# Patient Record
Sex: FEMALE | Race: WHITE | URBAN - METROPOLITAN AREA
[De-identification: names, ages, dates, MRNs, and addresses within clinical notes are randomized per-mention and may not be internally consistent; named-entity substitution may affect disease eponyms.]

---

## 2019-02-11 ENCOUNTER — OFFICE VISIT (OUTPATIENT)
Dept: FAMILY MEDICINE CLINIC | Age: 79
End: 2019-02-11
Payer: MEDICARE

## 2019-02-11 VITALS
RESPIRATION RATE: 18 BRPM | TEMPERATURE: 96.8 F | HEART RATE: 130 BPM | OXYGEN SATURATION: 99 % | SYSTOLIC BLOOD PRESSURE: 142 MMHG | DIASTOLIC BLOOD PRESSURE: 80 MMHG | HEIGHT: 63 IN | WEIGHT: 180 LBS | BODY MASS INDEX: 31.89 KG/M2

## 2019-02-11 DIAGNOSIS — T78.40XA ALLERGIC REACTION TO DRUG, INITIAL ENCOUNTER: Primary | ICD-10-CM

## 2019-02-11 DIAGNOSIS — R22.0 FACIAL SWELLING: ICD-10-CM

## 2019-02-11 PROCEDURE — 99213 OFFICE O/P EST LOW 20 MIN: CPT | Performed by: NURSE PRACTITIONER

## 2019-02-11 PROCEDURE — 96372 THER/PROPH/DIAG INJ SC/IM: CPT | Performed by: NURSE PRACTITIONER

## 2019-02-11 RX ORDER — OMEPRAZOLE 20 MG/1
20 CAPSULE, DELAYED RELEASE ORAL
COMMUNITY
End: 2020-02-17

## 2019-02-11 RX ORDER — SIMVASTATIN 40 MG
40 TABLET ORAL NIGHTLY
COMMUNITY
Start: 2018-11-26

## 2019-02-11 RX ORDER — TRIAMCINOLONE ACETONIDE 40 MG/ML
80 INJECTION, SUSPENSION INTRA-ARTICULAR; INTRAMUSCULAR ONCE
Status: COMPLETED | OUTPATIENT
Start: 2019-02-11 | End: 2019-02-11

## 2019-02-11 RX ORDER — LOSARTAN POTASSIUM 100 MG/1
100 TABLET ORAL DAILY
COMMUNITY
Start: 2018-12-18

## 2019-02-11 RX ORDER — PANTOPRAZOLE SODIUM 40 MG/1
40 TABLET, DELAYED RELEASE ORAL DAILY
COMMUNITY
Start: 2019-01-18 | End: 2020-02-24 | Stop reason: CLARIF

## 2019-02-11 RX ORDER — IBANDRONATE SODIUM 150 MG/1
50 TABLET, FILM COATED ORAL
COMMUNITY
End: 2020-02-24 | Stop reason: ALTCHOICE

## 2019-02-11 RX ORDER — PROPRANOLOL HCL 60 MG
60 CAPSULE, EXTENDED RELEASE 24HR ORAL DAILY
COMMUNITY
Start: 2018-11-26 | End: 2020-06-02 | Stop reason: SDUPTHER

## 2019-02-11 RX ORDER — TRIMETHOPRIM 100 MG/1
TABLET ORAL
COMMUNITY
Start: 2019-01-23 | End: 2019-02-11

## 2019-02-11 RX ORDER — MELOXICAM 15 MG/1
15 TABLET ORAL PRN
COMMUNITY
Start: 2018-12-18

## 2019-02-11 RX ADMIN — TRIAMCINOLONE ACETONIDE 80 MG: 40 INJECTION, SUSPENSION INTRA-ARTICULAR; INTRAMUSCULAR at 14:14

## 2019-02-11 ASSESSMENT — PATIENT HEALTH QUESTIONNAIRE - PHQ9
2. FEELING DOWN, DEPRESSED OR HOPELESS: 0
SUM OF ALL RESPONSES TO PHQ QUESTIONS 1-9: 0
SUM OF ALL RESPONSES TO PHQ9 QUESTIONS 1 & 2: 0
1. LITTLE INTEREST OR PLEASURE IN DOING THINGS: 0
SUM OF ALL RESPONSES TO PHQ QUESTIONS 1-9: 0

## 2019-02-11 ASSESSMENT — ENCOUNTER SYMPTOMS: ALLERGIC REACTION: 1

## 2019-05-16 ENCOUNTER — OFFICE VISIT (OUTPATIENT)
Dept: FAMILY MEDICINE CLINIC | Age: 79
End: 2019-05-16
Payer: MEDICARE

## 2019-05-16 VITALS
SYSTOLIC BLOOD PRESSURE: 132 MMHG | TEMPERATURE: 98.9 F | DIASTOLIC BLOOD PRESSURE: 78 MMHG | OXYGEN SATURATION: 98 % | BODY MASS INDEX: 32.64 KG/M2 | RESPIRATION RATE: 16 BRPM | HEART RATE: 81 BPM | WEIGHT: 184.2 LBS | HEIGHT: 63 IN

## 2019-05-16 DIAGNOSIS — J40 BRONCHITIS: Primary | ICD-10-CM

## 2019-05-16 PROBLEM — Z86.0100 HISTORY OF COLONIC POLYPS: Status: ACTIVE | Noted: 2017-08-09

## 2019-05-16 PROBLEM — Z96.9 RETAINED ORTHOPEDIC HARDWARE: Status: ACTIVE | Noted: 2018-02-22

## 2019-05-16 PROBLEM — Z86.010 HISTORY OF COLONIC POLYPS: Status: ACTIVE | Noted: 2017-08-09

## 2019-05-16 PROCEDURE — 99213 OFFICE O/P EST LOW 20 MIN: CPT | Performed by: NURSE PRACTITIONER

## 2019-05-16 RX ORDER — BENZONATATE 100 MG/1
200 CAPSULE ORAL 3 TIMES DAILY PRN
Qty: 20 CAPSULE | Refills: 0 | Status: SHIPPED | OUTPATIENT
Start: 2019-05-16 | End: 2019-05-21 | Stop reason: SDUPTHER

## 2019-05-16 RX ORDER — DOXYCYCLINE HYCLATE 100 MG
100 TABLET ORAL 2 TIMES DAILY
Qty: 14 TABLET | Refills: 0 | Status: SHIPPED | OUTPATIENT
Start: 2019-05-16 | End: 2019-05-23

## 2019-05-16 RX ORDER — ALBUTEROL SULFATE 90 UG/1
2 AEROSOL, METERED RESPIRATORY (INHALATION) EVERY 6 HOURS PRN
Qty: 1 INHALER | Refills: 0 | Status: SHIPPED | OUTPATIENT
Start: 2019-05-16 | End: 2020-02-24 | Stop reason: ALTCHOICE

## 2019-05-16 ASSESSMENT — ENCOUNTER SYMPTOMS
SORE THROAT: 0
WHEEZING: 1
COUGH: 1
SHORTNESS OF BREATH: 0
RHINORRHEA: 0
HEMOPTYSIS: 0
HEARTBURN: 0

## 2019-05-16 NOTE — PROGRESS NOTES
Subjective  Dave Carson, 66 y.o. female presents today with:  Chief Complaint   Patient presents with    Cough     2 days with yellow phlegm    Chest Congestion       Cough   This is a new problem. The current episode started in the past 7 days. The problem has been gradually worsening. The problem occurs every few minutes. The cough is productive of sputum. Associated symptoms include nasal congestion, postnasal drip and wheezing. Pertinent negatives include no chest pain, chills, ear congestion, ear pain, eye redness, fever, headaches, heartburn, hemoptysis, myalgias, rash, rhinorrhea, sore throat, shortness of breath, sweats or weight loss. Nothing aggravates the symptoms. She has tried OTC cough suppressant for the symptoms. The treatment provided mild relief. There is no history of COPD. Review of Systems   Constitutional: Positive for appetite change (decreased), diaphoresis and fatigue. Negative for chills, fever and weight loss. HENT: Positive for congestion, postnasal drip, sinus pressure, sneezing and voice change. Negative for dental problem, ear discharge, ear pain, facial swelling, mouth sores, rhinorrhea, sinus pain, sore throat, tinnitus and trouble swallowing. Eyes: Negative for photophobia, pain, discharge, redness and itching. Respiratory: Positive for cough and wheezing. Negative for hemoptysis, chest tightness, shortness of breath and stridor. Cardiovascular: Negative for chest pain. Gastrointestinal: Negative for diarrhea, heartburn, nausea and vomiting. Musculoskeletal: Negative for arthralgias and myalgias. Skin: Negative for pallor and rash. Neurological: Negative for dizziness and headaches.        Objective    Vitals:    05/16/19 1256   BP: 132/78   Site: Left Upper Arm   Position: Sitting   Cuff Size: Medium Adult   Pulse: 81   Resp: 16   Temp: 98.9 °F (37.2 °C)   TempSrc: Oral   SpO2: 98%   Weight: 184 lb 3.2 oz (83.6 kg)   Height: 5' 3\" (1.6 m) Physical Exam   Constitutional: She is oriented to person, place, and time. She appears well-developed and well-nourished. She is active and cooperative. No distress. HENT:   Head: Normocephalic and atraumatic. Right Ear: Hearing, tympanic membrane, external ear and ear canal normal. No mastoid tenderness. No middle ear effusion. Left Ear: Hearing, tympanic membrane, external ear and ear canal normal. No mastoid tenderness. No middle ear effusion. Nose: No mucosal edema or rhinorrhea. Right sinus exhibits no maxillary sinus tenderness and no frontal sinus tenderness. Left sinus exhibits no maxillary sinus tenderness and no frontal sinus tenderness. Mouth/Throat: Uvula is midline and mucous membranes are normal. No oral lesions. No uvula swelling. Posterior oropharyngeal erythema present. No oropharyngeal exudate, posterior oropharyngeal edema or tonsillar abscesses. No tonsillar exudate. Eyes: Conjunctivae, EOM and lids are normal. Right eye exhibits no discharge. Left eye exhibits no discharge. Neck: Normal range of motion and full passive range of motion without pain. Neck supple. Cardiovascular: Normal rate, regular rhythm, S1 normal, S2 normal and normal heart sounds. Pulmonary/Chest: Effort normal. No accessory muscle usage. No respiratory distress. She has no decreased breath sounds. She has wheezes. She has no rhonchi. She has no rales. Musculoskeletal: Normal range of motion. Lymphadenopathy:     She has no cervical adenopathy. Neurological: She is alert and oriented to person, place, and time. She has normal strength. Coordination and gait normal.   Skin: Skin is warm and dry. Capillary refill takes less than 2 seconds. No petechiae and no rash noted. She is not diaphoretic. Psychiatric: She has a normal mood and affect. Her speech is normal and behavior is normal.   Nursing note and vitals reviewed.     POC Testing Today: No results found for this visit on 05/16/19. Assessment & Plan    Diagnosis Orders   1. Bronchitis  doxycycline hyclate (VIBRA-TABS) 100 MG tablet    albuterol sulfate  (90 Base) MCG/ACT inhaler    benzonatate (TESSALON) 100 MG capsule     Antibiotic Instructions: Complete the full course of antibiotics as ordered. Take each dose with a small snack or meal to lessen potential GI upset. To prevent antibiotic resistance, please take medication as ordered and for the full duration even if you start to feel better. Consider intake of yogurt or probiotic during antibiotic use and for a few days after to help reduce the risk of developing a secondary infection. Separate the yogurt and antibiotic by at least 1 hour. Avoid alcohol while taking antibiotics. Return if symptoms worsen or fail to improve, for follow-up with PCP. Side effects and adverse effects of any medication prescribed today, as well as treatment plan/rationale,follow-up care, and result expectations have been discussed with the patient. Expresses understanding and desires to proceed with treatment plan. Discussed signs and symptoms which require immediate follow-up in ED/call to 911. Understanding verbalized. I have reviewed and updated the electronic medical record.     SAUL Garcia - NP

## 2019-05-17 ASSESSMENT — ENCOUNTER SYMPTOMS
EYE PAIN: 0
FACIAL SWELLING: 0
STRIDOR: 0
TROUBLE SWALLOWING: 0
EYE DISCHARGE: 0
DIARRHEA: 0
VOMITING: 0
EYE REDNESS: 0
PHOTOPHOBIA: 0
EYE ITCHING: 0
SINUS PRESSURE: 1
CHEST TIGHTNESS: 0
SINUS PAIN: 0
VOICE CHANGE: 1
NAUSEA: 0

## 2020-02-17 ENCOUNTER — OFFICE VISIT (OUTPATIENT)
Dept: FAMILY MEDICINE CLINIC | Age: 80
End: 2020-02-17
Payer: MEDICARE

## 2020-02-17 VITALS
HEIGHT: 63 IN | WEIGHT: 184 LBS | TEMPERATURE: 98.2 F | OXYGEN SATURATION: 98 % | DIASTOLIC BLOOD PRESSURE: 68 MMHG | BODY MASS INDEX: 32.6 KG/M2 | SYSTOLIC BLOOD PRESSURE: 120 MMHG | HEART RATE: 76 BPM

## 2020-02-17 PROCEDURE — 1036F TOBACCO NON-USER: CPT | Performed by: NURSE PRACTITIONER

## 2020-02-17 PROCEDURE — 1090F PRES/ABSN URINE INCON ASSESS: CPT | Performed by: NURSE PRACTITIONER

## 2020-02-17 PROCEDURE — 99213 OFFICE O/P EST LOW 20 MIN: CPT | Performed by: NURSE PRACTITIONER

## 2020-02-17 PROCEDURE — 1123F ACP DISCUSS/DSCN MKR DOCD: CPT | Performed by: NURSE PRACTITIONER

## 2020-02-17 PROCEDURE — G8484 FLU IMMUNIZE NO ADMIN: HCPCS | Performed by: NURSE PRACTITIONER

## 2020-02-17 PROCEDURE — G8400 PT W/DXA NO RESULTS DOC: HCPCS | Performed by: NURSE PRACTITIONER

## 2020-02-17 PROCEDURE — G8417 CALC BMI ABV UP PARAM F/U: HCPCS | Performed by: NURSE PRACTITIONER

## 2020-02-17 PROCEDURE — 4040F PNEUMOC VAC/ADMIN/RCVD: CPT | Performed by: NURSE PRACTITIONER

## 2020-02-17 PROCEDURE — G8427 DOCREV CUR MEDS BY ELIG CLIN: HCPCS | Performed by: NURSE PRACTITIONER

## 2020-02-17 RX ORDER — AMOXICILLIN AND CLAVULANATE POTASSIUM 875; 125 MG/1; MG/1
1 TABLET, FILM COATED ORAL 2 TIMES DAILY
Qty: 20 TABLET | Refills: 0 | Status: SHIPPED | OUTPATIENT
Start: 2020-02-17 | End: 2020-02-27

## 2020-02-17 RX ORDER — SERTRALINE HYDROCHLORIDE 25 MG/1
25 TABLET, FILM COATED ORAL DAILY
COMMUNITY
End: 2020-06-02 | Stop reason: SDUPTHER

## 2020-02-17 RX ORDER — FLUTICASONE PROPIONATE 50 MCG
2 SPRAY, SUSPENSION (ML) NASAL DAILY
Qty: 1 BOTTLE | Refills: 0 | Status: SHIPPED | OUTPATIENT
Start: 2020-02-17 | End: 2020-04-20

## 2020-02-17 ASSESSMENT — ENCOUNTER SYMPTOMS
ABDOMINAL PAIN: 0
DIARRHEA: 0
CHEST TIGHTNESS: 0
SINUS PAIN: 1
RHINORRHEA: 1
FACIAL SWELLING: 0
VOMITING: 0
SHORTNESS OF BREATH: 0
WHEEZING: 0
SORE THROAT: 0
NAUSEA: 0
SINUS PRESSURE: 1

## 2020-02-17 ASSESSMENT — PATIENT HEALTH QUESTIONNAIRE - PHQ9
SUM OF ALL RESPONSES TO PHQ QUESTIONS 1-9: 0
SUM OF ALL RESPONSES TO PHQ QUESTIONS 1-9: 0
SUM OF ALL RESPONSES TO PHQ9 QUESTIONS 1 & 2: 0
2. FEELING DOWN, DEPRESSED OR HOPELESS: 0
1. LITTLE INTEREST OR PLEASURE IN DOING THINGS: 0

## 2020-02-17 NOTE — PATIENT INSTRUCTIONS
Antibiotic Instructions:   Complete the full course of antibiotics as ordered. To prevent antibiotic resistances please take medication as ordered and for the full duration even if you start to feel better. Take each dose with a small snack or meal to lessen potential GI upset. Consider intake of yogurt or probiotic during antibiotic use and for a few days after to help reduce the risk of developing a secondary infection. Take the yogurt or probiotic at least 2 hours after taking the antibiotic. Avoid alcohol while taking antibiotics. Fluticasone Nasal Spray Instructions: Insert bottle into nostril and use finger to pinch the opposite nostril closed. Look slightly down with your head. Inhale through your nose while spraying the medicine. Repeat back and forth with each nostril for two sprays into each nostril, four sprays total one time per day. The effect of the medication may not be felt immediately; it builds over repeated usage.

## 2020-02-17 NOTE — PROGRESS NOTES
Subjective  Kathycami Walsh, 78 y.o. female presents today with:  Chief Complaint   Patient presents with    Drainage     Drainage, and facial pain. x3 weeks Pt has used Afran and multiple OTCs       HPI  Pt reports with complaint of sinus problem for the past 2-3 weeks. Pt report nasal congestion and rhinorrhea. Associated symptoms includes pain and pressure in the left ear,   Pertinent negatives include no fever, chills, cough, postnasal drip, sore throat, or shortness of breath. Pt has been taking afrin and coricidin with little relief. Has been using afrin for weeks. Has been told previously abound rebound effect. Knows this is likely going to be an issue again. Review of Systems   Constitutional: Negative for appetite change, chills and fever. HENT: Positive for congestion, ear pain (left ear pain), rhinorrhea, sinus pressure, sinus pain and tinnitus. Negative for ear discharge, facial swelling, postnasal drip and sore throat. Respiratory: Negative for chest tightness, shortness of breath and wheezing. Gastrointestinal: Negative for abdominal pain, diarrhea, nausea and vomiting. Neurological: Negative for dizziness, weakness, light-headedness and headaches. Objective    Vitals:    02/17/20 1056   BP: 120/68   Pulse: 76   Temp: 98.2 °F (36.8 °C)   SpO2: 98%   Weight: 184 lb (83.5 kg)   Height: 5' 3\" (1.6 m)       Physical Exam  Vitals signs reviewed. Constitutional:       General: She is not in acute distress. Appearance: She is well-developed. She is not ill-appearing or diaphoretic. HENT:      Head: Normocephalic and atraumatic. Right Ear: Tympanic membrane, ear canal and external ear normal.      Left Ear: Ear canal and external ear normal. A middle ear effusion is present. Nose: Mucosal edema present. Right Sinus: Maxillary sinus tenderness and frontal sinus tenderness present.       Left Sinus: Maxillary sinus tenderness and frontal sinus tenderness

## 2020-02-24 ENCOUNTER — OFFICE VISIT (OUTPATIENT)
Dept: FAMILY MEDICINE CLINIC | Age: 80
End: 2020-02-24
Payer: MEDICARE

## 2020-02-24 VITALS
WEIGHT: 185 LBS | SYSTOLIC BLOOD PRESSURE: 130 MMHG | HEART RATE: 72 BPM | DIASTOLIC BLOOD PRESSURE: 60 MMHG | BODY MASS INDEX: 32.78 KG/M2 | HEIGHT: 63 IN | OXYGEN SATURATION: 98 % | TEMPERATURE: 97.5 F

## 2020-02-24 PROBLEM — I10 ESSENTIAL HYPERTENSION: Status: ACTIVE | Noted: 2020-02-24

## 2020-02-24 PROBLEM — E66.811 CLASS 1 OBESITY: Status: ACTIVE | Noted: 2020-02-24

## 2020-02-24 PROBLEM — H91.90 HEARING LOSS: Status: ACTIVE | Noted: 2020-02-24

## 2020-02-24 PROBLEM — E66.9 CLASS 1 OBESITY: Status: ACTIVE | Noted: 2020-02-24

## 2020-02-24 PROBLEM — E55.9 VITAMIN D DEFICIENCY: Status: ACTIVE | Noted: 2020-02-24

## 2020-02-24 PROBLEM — F32.0 MILD MAJOR DEPRESSION, SINGLE EPISODE (HCC): Status: ACTIVE | Noted: 2020-02-24

## 2020-02-24 PROCEDURE — 1036F TOBACCO NON-USER: CPT | Performed by: FAMILY MEDICINE

## 2020-02-24 PROCEDURE — G8400 PT W/DXA NO RESULTS DOC: HCPCS | Performed by: FAMILY MEDICINE

## 2020-02-24 PROCEDURE — 1090F PRES/ABSN URINE INCON ASSESS: CPT | Performed by: FAMILY MEDICINE

## 2020-02-24 PROCEDURE — G8427 DOCREV CUR MEDS BY ELIG CLIN: HCPCS | Performed by: FAMILY MEDICINE

## 2020-02-24 PROCEDURE — 99203 OFFICE O/P NEW LOW 30 MIN: CPT | Performed by: FAMILY MEDICINE

## 2020-02-24 PROCEDURE — G8482 FLU IMMUNIZE ORDER/ADMIN: HCPCS | Performed by: FAMILY MEDICINE

## 2020-02-24 PROCEDURE — G8417 CALC BMI ABV UP PARAM F/U: HCPCS | Performed by: FAMILY MEDICINE

## 2020-02-24 PROCEDURE — 4040F PNEUMOC VAC/ADMIN/RCVD: CPT | Performed by: FAMILY MEDICINE

## 2020-02-24 PROCEDURE — 1123F ACP DISCUSS/DSCN MKR DOCD: CPT | Performed by: FAMILY MEDICINE

## 2020-02-24 RX ORDER — FAMOTIDINE 20 MG/1
TABLET, FILM COATED ORAL DAILY
COMMUNITY

## 2020-02-24 SDOH — ECONOMIC STABILITY: FOOD INSECURITY: WITHIN THE PAST 12 MONTHS, YOU WORRIED THAT YOUR FOOD WOULD RUN OUT BEFORE YOU GOT MONEY TO BUY MORE.: NEVER TRUE

## 2020-02-24 SDOH — ECONOMIC STABILITY: TRANSPORTATION INSECURITY
IN THE PAST 12 MONTHS, HAS THE LACK OF TRANSPORTATION KEPT YOU FROM MEDICAL APPOINTMENTS OR FROM GETTING MEDICATIONS?: NO

## 2020-02-24 SDOH — ECONOMIC STABILITY: FOOD INSECURITY: WITHIN THE PAST 12 MONTHS, THE FOOD YOU BOUGHT JUST DIDN'T LAST AND YOU DIDN'T HAVE MONEY TO GET MORE.: NEVER TRUE

## 2020-02-24 SDOH — ECONOMIC STABILITY: INCOME INSECURITY: HOW HARD IS IT FOR YOU TO PAY FOR THE VERY BASICS LIKE FOOD, HOUSING, MEDICAL CARE, AND HEATING?: NOT HARD AT ALL

## 2020-02-24 SDOH — ECONOMIC STABILITY: TRANSPORTATION INSECURITY
IN THE PAST 12 MONTHS, HAS LACK OF TRANSPORTATION KEPT YOU FROM MEETINGS, WORK, OR FROM GETTING THINGS NEEDED FOR DAILY LIVING?: NO

## 2020-02-24 ASSESSMENT — ENCOUNTER SYMPTOMS
CONSTIPATION: 0
VOICE CHANGE: 0
EYE DISCHARGE: 0
COLOR CHANGE: 0
SHORTNESS OF BREATH: 0
ABDOMINAL PAIN: 0
COUGH: 0
TROUBLE SWALLOWING: 0
ABDOMINAL DISTENTION: 0
DIARRHEA: 0
NAUSEA: 0

## 2020-02-24 NOTE — PATIENT INSTRUCTIONS
Patient Education        Recovering From Depression: Care Instructions  Your Care Instructions    Taking good care of yourself is important as you recover from depression. In time, your symptoms will fade as your treatment takes hold. Do not give up. Instead, focus your energy on getting better. Your mood will improve. It just takes some time. Focus on things that can help you feel better, such as being with friends and family, eating well, and getting enough rest. But take things slowly. Do not do too much too soon. You will begin to feel better gradually. Follow-up care is a key part of your treatment and safety. Be sure to make and go to all appointments, and call your doctor if you are having problems. It's also a good idea to know your test results and keep a list of the medicines you take. How can you care for yourself at home? Be realistic  · If you have a large task to do, break it up into smaller steps you can handle, and just do what you can. · You may want to put off important decisions until your depression has lifted. If you have plans that will have a major impact on your life, such as marriage, divorce, or a job change, try to wait a bit. Talk it over with friends and loved ones who can help you look at the overall picture first.  · Reaching out to people for help is important. Do not isolate yourself. Let your family and friends help you. Find someone you can trust and confide in, and talk to that person. · Be patient, and be kind to yourself. Remember that depression is not your fault and is not something you can overcome with willpower alone. Treatment is necessary for depression, just like for any other illness. Feeling better takes time, and your mood will improve little by little. Stay active  · Stay busy and get outside. Take a walk, or try some other light exercise. · Talk with your doctor about an exercise program. Exercise can help with mild depression. · Go to a movie or concert. Take part in a Yazidism activity or other social gathering. Go to a [email or web address] game. · Ask a friend to have dinner with you. Take care of yourself  · Eat a balanced diet with plenty of fresh fruits and vegetables, whole grains, and lean protein. If you have lost your appetite, eat small snacks rather than large meals. · Avoid drinking alcohol or using illegal drugs. Do not take medicines that have not been prescribed for you. They may interfere with medicines you may be taking for depression, or they may make your depression worse. · Take your medicines exactly as they are prescribed. You may start to feel better within 1 to 3 weeks of taking antidepressant medicine. But it can take as many as 6 to 8 weeks to see more improvement. If you have questions or concerns about your medicines, or if you do not notice any improvement by 3 weeks, talk to your doctor. · If you have any side effects from your medicine, tell your doctor. Antidepressants can make you feel tired, dizzy, or nervous. Some people have dry mouth, constipation, headaches, sexual problems, or diarrhea. Many of these side effects are mild and will go away on their own after you have been taking the medicine for a few weeks. Some may last longer. Talk to your doctor if side effects are bothering you too much. You might be able to try a different medicine. · Get enough sleep. If you have problems sleeping:  ? Go to bed at the same time every night, and get up at the same time every morning. ? Keep your bedroom dark and quiet. ? Do not exercise after 5:00 p.m.  ? Avoid drinks with caffeine after 5:00 p.m. · Avoid sleeping pills unless they are prescribed by the doctor treating your depression. Sleeping pills may make you groggy during the day, and they may interact with other medicine you are taking. · If you have any other illnesses, such as diabetes, heart disease, or high blood pressure, make sure to continue with your treatment.  Tell your doctor about

## 2020-02-24 NOTE — PROGRESS NOTES
Subjective:      Patient ID: Durga Reynoso is a 78 y.o. female who presents for:  Chief Complaint   Patient presents with   [address]     Dr Ximena Clark was previous PCP, Rachell about 1 month ago     Hyperlipidemia    Hypertension    Health Maintenance     will get records and go from there? Sertraline is a recent addition since her 's death. She definitely feels it is helping her. She is crying less, but still able to cry. Feels motion but is not run by them. Sleeping well. The only problem she is having is she still overeating    She currently taking Augmentin for sinus infection and feels that that has completely resolved her situation along with Flonase. She will continue that. Hypertension   This is a chronic problem. The current episode started more than 1 year ago. The problem is unchanged. The problem is controlled. Pertinent negatives include no chest pain, headaches, palpitations, peripheral edema or shortness of breath. There are no associated agents to hypertension. Risk factors for coronary artery disease include dyslipidemia, post-menopausal state and obesity. Past treatments include angiotensin blockers. The current treatment provides moderate improvement. Compliance problems include exercise and diet. There is no history of CAD/MI. There is no history of chronic renal disease.        Current Outpatient Medications on File Prior to Visit   Medication Sig Dispense Refill    famotidine (PEPCID) 20 MG tablet Take by mouth daily       sertraline (ZOLOFT) 25 MG tablet Take 25 mg by mouth daily      amoxicillin-clavulanate (AUGMENTIN) 875-125 MG per tablet Take 1 tablet by mouth 2 times daily for 10 days 20 tablet 0    fluticasone (FLONASE) 50 MCG/ACT nasal spray 2 sprays by Nasal route daily for 14 days 1 Bottle 0    simvastatin (ZOCOR) 40 MG tablet Take 40 mg by mouth nightly       propranolol (INDERAL LA) 60 MG extended release capsule Take 60 mg by mouth daily  meloxicam (MOBIC) 15 MG tablet 15 mg as needed       losartan (COZAAR) 100 MG tablet Take 100 mg by mouth daily        No current facility-administered medications on file prior to visit. Past Medical History:   Diagnosis Date    Diverticulosis of colon with hemorrhage     DUB (dysfunctional uterine bleeding)     Gastric ulcer     History of basal cell cancer     Hyperlipidemia     Hypertension     Left wrist fracture     Thyroid cyst     Wrist fracture, right      Past Surgical History:   Procedure Laterality Date    HYSTERECTOMY, TOTAL ABDOMINAL      THYROIDECTOMY       Social History     Socioeconomic History    Marital status:      Spouse name: Not on file    Number of children: Not on file    Years of education: Not on file    Highest education level: Not on file   Occupational History    Not on file   Social Needs    Financial resource strain: Not hard at all   [employer] insecurity:     Worry: Never true     Inability: Never true   [employer] needs:     Medical: No     Non-medical: No   Tobacco Use    Smoking status: Never Smoker    Smokeless tobacco: Never Used   Substance and Sexual Activity    Alcohol use: No     Comment: occ.     Drug use: No    Sexual activity: Never   Lifestyle    Physical activity:     Days per week: Not on file     Minutes per session: Not on file    Stress: Not on file   Relationships    Social connections:     Talks on phone: Not on file     Gets together: Not on file     Attends [employer] service: Not on file     Active member of club or organization: Not on file     Attends meetings of clubs or organizations: Not on file     Relationship status: Not on file    Intimate partner violence:     Fear of current or ex partner: Not on file     Emotionally abused: Not on file     Physically abused: Not on file     Forced sexual activity: Not on file   Other Topics Concern    Not on file   Social History Narrative    Not on file     No family history on file. Allergies:  Trimethoprim; Aspirin; Demerol hcl [meperidine]; and Sulfa antibiotics    Review of Systems   Constitutional: Negative for activity change, appetite change, fatigue and unexpected weight change. HENT: Negative for congestion, dental problem, trouble swallowing and voice change. Eyes: Negative for discharge and visual disturbance. Respiratory: Negative for cough and shortness of breath. Cardiovascular: Negative for chest pain and palpitations. Gastrointestinal: Negative for abdominal distention, abdominal pain, constipation, diarrhea and nausea. Endocrine: Negative for polydipsia and polyuria. Genitourinary: Negative for dysuria and urgency. Musculoskeletal: Negative for gait problem and joint swelling. Skin: Negative for color change and rash. Allergic/Immunologic: Negative for environmental allergies and food allergies. Neurological: Negative for speech difficulty, weakness and headaches. Hematological: Does not bruise/bleed easily. Psychiatric/Behavioral: Negative for agitation and behavioral problems. Objective:   /60   Pulse 72   Temp 97.5 °F (36.4 °C)   Ht 5' 3\" (1.6 m)   Wt 185 lb (83.9 kg)   SpO2 98%   Breastfeeding No   BMI 32.77 kg/m²     Physical Exam  Constitutional:       Appearance: Normal appearance. She is well-developed. She is not ill-appearing or diaphoretic. Comments: Vitals signs reviewed   HENT:      Head: Normocephalic and atraumatic. Right Ear: Hearing and external ear normal.      Left Ear: Hearing and external ear normal.      Nose: No nasal deformity or rhinorrhea. Eyes:      General: Lids are normal.         Right eye: No discharge. Left eye: No discharge. Extraocular Movements:      Right eye: No nystagmus. Left eye: No nystagmus. Conjunctiva/sclera: Conjunctivae normal.      Right eye: No hemorrhage. Left eye: No hemorrhage.      Pupils: Pupils are equal, round, and reactive to light. Neck:      Musculoskeletal: Full passive range of motion without pain and neck supple. Normal range of motion. Thyroid: No thyroid mass or thyromegaly. Vascular: Normal carotid pulses. No carotid bruit or JVD. Trachea: No tracheal tenderness or tracheal deviation. Cardiovascular:      Rate and Rhythm: Normal rate and regular rhythm. Chest Wall: PMI displaced: normal location PMI. Pulses:           Carotid pulses are 2+ on the right side and 2+ on the left side. Radial pulses are 2+ on the right side and 2+ on the left side. Heart sounds: S1 normal and S2 normal. No murmur. No friction rub. No gallop. No S3 sounds. Pulmonary:      Effort: Pulmonary effort is normal. No accessory muscle usage or respiratory distress. Breath sounds: Normal breath sounds. Chest:      Chest wall: No deformity. Abdominal:      General: There is no distension. Musculoskeletal:         General: No deformity. Cervical back: She exhibits normal range of motion, no tenderness and no deformity. Lymphadenopathy:      Cervical:      Right cervical: No superficial cervical adenopathy. Left cervical: No superficial cervical adenopathy. Upper Body:      Right upper body: No supraclavicular adenopathy. Left upper body: No supraclavicular adenopathy. Skin:     General: Skin is warm and dry. Findings: No bruising or rash. Nails: There is no clubbing. Neurological:      Mental Status: She is alert. Cranial Nerves: Cranial nerve deficit: CN II-XII GI without obvious deficit. Sensory: Sensory deficit: grossly intact for hands. Motor: No tremor. Atrophy: B UE and LE without atrophy. Abnormal muscle tone: B UE and LE have normal tone. Coordination: Coordination normal.      Gait: Gait normal.   Psychiatric:         Attention and Perception: She is attentive. Mood and Affect: Mood is not anxious. Affect is not inappropriate. Speech: Speech normal.         Behavior: Behavior is not agitated. Behavior is cooperative. Judgment: Judgment normal.         No results found for this visit on 02/24/20. No results found for this or any previous visit (from the past 2016 hour(s)). Assessment:       Diagnosis Orders   1. Essential hypertension     2. Gastroesophageal reflux disease without esophagitis     3. Mild major depression, single episode ([facility] [address]           No orders of the defined types were placed in this encounter. Plan:   Return in about 3 months (around 5/24/2020) for for routine major medical condition managemenwithin a month for ten [facility]OM[facility] Patient Instructions       Patient Education        Recovering From Depression: Care Instructions  Your Care Instructions    Taking good care of yourself is important as you recover from depression. In time, your symptoms will fade as your treatment takes hold. Do not give up. Instead, focus your energy on getting better. Your mood will improve. It just takes some time. Focus on things that can help you feel better, such as being with friends and family, eating well, and getting enough rest. But take things slowly. Do not do too much too soon. You will begin to feel better gradually. Follow-up care is a key part of your treatment and safety. Be sure to make and go to all appointments, and call your doctor if you are having problems. It's also a good idea to know your test results and keep a list of the medicines you take. How can you care for yourself at home? Be realistic  · If you have a large task to do, break it up into smaller steps you can handle, and just do what you can. · You may want to put off important decisions until your depression has lifted. If you have plans that will have a major impact on your life, such as marriage, divorce, or a job change, try to wait a bit.  Talk it over with friends and loved ones who can help you look at the overall picture first.  · Reaching out to people for help is important. Do not isolate yourself. Let your family and friends help you. Find someone you can trust and confide in, and talk to that person. · Be patient, and be kind to yourself. Remember that depression is not your fault and is not something you can overcome with willpower alone. Treatment is necessary for depression, just like for any other illness. Feeling better takes time, and your mood will improve little by little. Stay active  · Stay busy and get outside. Take a walk, or try some other light exercise. · Talk with your doctor about an exercise program. Exercise can help with mild depression. · Go to a movie or concert. Take part in a Hindu activity or other social gathering. Go to a [email or web address] game. · Ask a friend to have dinner with you. Take care of yourself  · Eat a balanced diet with plenty of fresh fruits and vegetables, whole grains, and lean protein. If you have lost your appetite, eat small snacks rather than large meals. · Avoid drinking alcohol or using illegal drugs. Do not take medicines that have not been prescribed for you. They may interfere with medicines you may be taking for depression, or they may make your depression worse. · Take your medicines exactly as they are prescribed. You may start to feel better within 1 to 3 weeks of taking antidepressant medicine. But it can take as many as 6 to 8 weeks to see more improvement. If you have questions or concerns about your medicines, or if you do not notice any improvement by 3 weeks, talk to your doctor. · If you have any side effects from your medicine, tell your doctor. Antidepressants can make you feel tired, dizzy, or nervous. Some people have dry mouth, constipation, headaches, sexual problems, or diarrhea. Many of these side effects are mild and will go away on their own after you have been taking the medicine for a few weeks. Some may last longer.  Talk to your doctor if side effects are bothering you too much. You might be able to try a different medicine. · Get enough sleep. If you have problems sleeping:  ? Go to bed at the same time every night, and get up at the same time every morning. ? Keep your bedroom dark and quiet. ? Do not exercise after 5:00 p.m.  ? Avoid drinks with caffeine after 5:00 p.m. · Avoid sleeping pills unless they are prescribed by the doctor treating your depression. Sleeping pills may make you groggy during the day, and they may interact with other medicine you are taking. · If you have any other illnesses, such as diabetes, heart disease, or high blood pressure, make sure to continue with your treatment. Tell your doctor about all of the medicines you take, including those with or without a prescription. · Keep the numbers for these national suicide hotlines: 6-925-828-TALK (9-469.986.2960) and 9-301-GSCBVWY (5-806.272.1618). If you or someone you know talks about suicide or feeling hopeless, get help right away. When should you call for help? Call 911 anytime you think you may need emergency care. For example, call if:    · You feel like hurting yourself or someone else.     · Someone you know has depression and is about to attempt or is attempting suicide.   [facility] your doctor now or seek immediate medical care if:    · You hear voices.     · Someone you know has depression and:  ? Starts to give away his or her possessions. ? Uses illegal drugs or drinks alcohol heavily. ? Talks or writes about death, including writing suicide notes or talking about guns, knives, or pills. ? Starts to spend a lot of time alone. ? Acts very aggressively or suddenly appears calm.    Watch closely for changes in your health, and be sure to contact your doctor if:    · You do not get better as expected. Where can you learn more? Go to https://[employer]. org and sign in to your [employer] account.  Enter X340 in the [employer] box to learn more about \"Recovering From Depression: Care Instructions. \"     If you do not have an account, please click on the \"Sign Up Now\" link. Current as of: May 28, 2019  Content Version: 12.3  © 6406-1677 Healthwise, Incorporated. Care instructions adapted under license by [facility] ([facility]). If you have questions about a medical condition or this instruction, always ask your healthcare professional. [address] any warranty or liability for your use of this information. Omid Ardon M.D.

## 2020-04-01 ENCOUNTER — TELEPHONE (OUTPATIENT)
Dept: FAMILY MEDICINE CLINIC | Age: 80
End: 2020-04-01

## 2020-04-20 RX ORDER — FLUTICASONE PROPIONATE 50 MCG
SPRAY, SUSPENSION (ML) NASAL
Qty: 16 G | Refills: 0 | Status: SHIPPED | OUTPATIENT
Start: 2020-04-20

## 2020-06-02 ENCOUNTER — OFFICE VISIT (OUTPATIENT)
Dept: FAMILY MEDICINE CLINIC | Age: 80
End: 2020-06-02
Payer: MEDICARE

## 2020-06-02 VITALS
OXYGEN SATURATION: 97 % | HEART RATE: 72 BPM | HEIGHT: 62 IN | DIASTOLIC BLOOD PRESSURE: 80 MMHG | SYSTOLIC BLOOD PRESSURE: 128 MMHG | TEMPERATURE: 98.1 F | BODY MASS INDEX: 35.04 KG/M2 | WEIGHT: 190.4 LBS

## 2020-06-02 PROBLEM — M17.10 ARTHRITIS OF KNEE: Status: ACTIVE | Noted: 2020-06-02

## 2020-06-02 PROBLEM — Z86.718 PERSONAL HISTORY OF OTHER VENOUS THROMBOSIS AND EMBOLISM: Status: ACTIVE | Noted: 2020-06-02

## 2020-06-02 PROCEDURE — 4040F PNEUMOC VAC/ADMIN/RCVD: CPT | Performed by: FAMILY MEDICINE

## 2020-06-02 PROCEDURE — 99213 OFFICE O/P EST LOW 20 MIN: CPT | Performed by: FAMILY MEDICINE

## 2020-06-02 PROCEDURE — G0439 PPPS, SUBSEQ VISIT: HCPCS | Performed by: FAMILY MEDICINE

## 2020-06-02 PROCEDURE — 1123F ACP DISCUSS/DSCN MKR DOCD: CPT | Performed by: FAMILY MEDICINE

## 2020-06-02 RX ORDER — SERTRALINE HYDROCHLORIDE 25 MG/1
25 TABLET, FILM COATED ORAL DAILY
Qty: 30 TABLET | Refills: 5 | Status: SHIPPED | OUTPATIENT
Start: 2020-06-02

## 2020-06-02 RX ORDER — PROPRANOLOL HCL 60 MG
60 CAPSULE, EXTENDED RELEASE 24HR ORAL DAILY
Qty: 30 CAPSULE | Refills: 5 | Status: SHIPPED | OUTPATIENT
Start: 2020-06-02 | End: 2020-12-08

## 2020-06-02 ASSESSMENT — LIFESTYLE VARIABLES
HOW OFTEN DURING THE LAST YEAR HAVE YOU FOUND THAT YOU WERE NOT ABLE TO STOP DRINKING ONCE YOU HAD STARTED: 0
HOW OFTEN DURING THE LAST YEAR HAVE YOU NEEDED AN ALCOHOLIC DRINK FIRST THING IN THE MORNING TO GET YOURSELF GOING AFTER A NIGHT OF HEAVY DRINKING: 0
HOW OFTEN DURING THE LAST YEAR HAVE YOU BEEN UNABLE TO REMEMBER WHAT HAPPENED THE NIGHT BEFORE BECAUSE YOU HAD BEEN DRINKING: 0
HAVE YOU OR SOMEONE ELSE BEEN INJURED AS A RESULT OF YOUR DRINKING: 0
HOW OFTEN DO YOU HAVE A DRINK CONTAINING ALCOHOL: 1
HOW OFTEN DURING THE LAST YEAR HAVE YOU FAILED TO DO WHAT WAS NORMALLY EXPECTED FROM YOU BECAUSE OF DRINKING: 0
HOW OFTEN DO YOU HAVE SIX OR MORE DRINKS ON ONE OCCASION: 0
AUDIT TOTAL SCORE: 1
HOW MANY STANDARD DRINKS CONTAINING ALCOHOL DO YOU HAVE ON A TYPICAL DAY: 0
HAS A RELATIVE, FRIEND, DOCTOR, OR ANOTHER HEALTH PROFESSIONAL EXPRESSED CONCERN ABOUT YOUR DRINKING OR SUGGESTED YOU CUT DOWN: 0
AUDIT-C TOTAL SCORE: 1
HOW OFTEN DURING THE LAST YEAR HAVE YOU HAD A FEELING OF GUILT OR REMORSE AFTER DRINKING: 0

## 2020-06-02 ASSESSMENT — PATIENT HEALTH QUESTIONNAIRE - PHQ9
SUM OF ALL RESPONSES TO PHQ QUESTIONS 1-9: 0
SUM OF ALL RESPONSES TO PHQ QUESTIONS 1-9: 0

## 2020-06-02 NOTE — PROGRESS NOTES
)    Screenings for behavioral, psychosocial and functional/safety risks, and cognitive dysfunction are all negative except as indicated below. These results, as well as other patient data from the [address] form, are documented in Flowsheets linked to this Encounter. Allergies   Allergen Reactions    Trimethoprim      Facial swelling, itching, generalized redness      Aspirin     Demerol Hcl [Meperidine]     Sulfa Antibiotics          Prior to Visit Medications    Medication Sig Taking? Authorizing Provider   propranolol (INDERAL LA) 60 MG extended release capsule Take 1 capsule by mouth daily Yes Eyad Ledesma MD   sertraline (ZOLOFT) 25 MG tablet Take 1 tablet by mouth daily Yes Eyad Ledesma MD   fluticasone (FLONASE) 50 MCG/ACT nasal spray use 2 (TWO) sprays IN EACH NOSTRIL DAILY FOR 14 DAYS Yes Eyad Ledesma MD   famotidine (PEPCID) 20 MG tablet Take by mouth daily  Yes Historical Provider, MD   simvastatin (ZOCOR) 40 MG tablet Take 40 mg by mouth nightly  Yes Historical Provider, MD   meloxicam (MOBIC) 15 MG tablet 15 mg as needed  Yes Historical Provider, MD   losartan (COZAAR) 100 MG tablet Take 100 mg by mouth daily  Yes Historical Provider, MD         Past Medical History:   Diagnosis Date    Diverticulosis of colon with hemorrhage     DUB (dysfunctional uterine bleeding)     Gastric ulcer     History of basal cell cancer     Hyperlipidemia     Hypertension     Left wrist fracture     Thyroid cyst     Wrist fracture, right        Past Surgical History:   Procedure Laterality Date    HYSTERECTOMY, TOTAL ABDOMINAL      THYROIDECTOMY         No family history on file.     CareTeam (Including outside providers/suppliers regularly involved in providing care):   Patient Care Team:  Eyad Ledesma MD as PCP - General (Family Medicine)  Mozelle Meckel, MD as Consulting Physician (Internal Medicine Cardiovascular Disease)  Beto Shea MD as Consulting

## 2020-12-08 RX ORDER — PROPRANOLOL HCL 60 MG
60 CAPSULE, EXTENDED RELEASE 24HR ORAL DAILY
Qty: 30 CAPSULE | Refills: 5 | Status: SHIPPED | OUTPATIENT
Start: 2020-12-08

## 2023-03-09 ENCOUNTER — OFFICE VISIT (OUTPATIENT)
Dept: FAMILY MEDICINE CLINIC | Age: 83
End: 2023-03-09
Payer: MEDICARE

## 2023-03-09 VITALS
BODY MASS INDEX: 35.19 KG/M2 | WEIGHT: 191.2 LBS | DIASTOLIC BLOOD PRESSURE: 62 MMHG | RESPIRATION RATE: 16 BRPM | TEMPERATURE: 98.1 F | HEART RATE: 71 BPM | OXYGEN SATURATION: 97 % | HEIGHT: 62 IN | SYSTOLIC BLOOD PRESSURE: 120 MMHG

## 2023-03-09 DIAGNOSIS — J01.90 ACUTE BACTERIAL SINUSITIS: Primary | ICD-10-CM

## 2023-03-09 DIAGNOSIS — B96.89 ACUTE BACTERIAL SINUSITIS: Primary | ICD-10-CM

## 2023-03-09 LAB
INFLUENZA A ANTIBODY: NORMAL
INFLUENZA B ANTIBODY: NORMAL
Lab: NORMAL
PERFORMING INSTRUMENT: NORMAL
QC PASS/FAIL: NORMAL
SARS-COV-2, POC: NORMAL

## 2023-03-09 PROCEDURE — 1036F TOBACCO NON-USER: CPT | Performed by: NURSE PRACTITIONER

## 2023-03-09 PROCEDURE — 3074F SYST BP LT 130 MM HG: CPT | Performed by: NURSE PRACTITIONER

## 2023-03-09 PROCEDURE — G8400 PT W/DXA NO RESULTS DOC: HCPCS | Performed by: NURSE PRACTITIONER

## 2023-03-09 PROCEDURE — 87804 INFLUENZA ASSAY W/OPTIC: CPT | Performed by: NURSE PRACTITIONER

## 2023-03-09 PROCEDURE — 1123F ACP DISCUSS/DSCN MKR DOCD: CPT | Performed by: NURSE PRACTITIONER

## 2023-03-09 PROCEDURE — 3078F DIAST BP <80 MM HG: CPT | Performed by: NURSE PRACTITIONER

## 2023-03-09 PROCEDURE — 1090F PRES/ABSN URINE INCON ASSESS: CPT | Performed by: NURSE PRACTITIONER

## 2023-03-09 PROCEDURE — G8417 CALC BMI ABV UP PARAM F/U: HCPCS | Performed by: NURSE PRACTITIONER

## 2023-03-09 PROCEDURE — 99213 OFFICE O/P EST LOW 20 MIN: CPT | Performed by: NURSE PRACTITIONER

## 2023-03-09 PROCEDURE — G8484 FLU IMMUNIZE NO ADMIN: HCPCS | Performed by: NURSE PRACTITIONER

## 2023-03-09 PROCEDURE — G8427 DOCREV CUR MEDS BY ELIG CLIN: HCPCS | Performed by: NURSE PRACTITIONER

## 2023-03-09 PROCEDURE — 87426 SARSCOV CORONAVIRUS AG IA: CPT | Performed by: NURSE PRACTITIONER

## 2023-03-09 RX ORDER — PAROXETINE 10 MG/1
TABLET, FILM COATED ORAL
COMMUNITY

## 2023-03-09 RX ORDER — SOLIFENACIN SUCCINATE 5 MG/1
TABLET, FILM COATED ORAL
COMMUNITY
Start: 2023-02-20

## 2023-03-09 RX ORDER — ACYCLOVIR 800 MG/1
TABLET ORAL
COMMUNITY

## 2023-03-09 RX ORDER — AMOXICILLIN 500 MG/1
CAPSULE ORAL
COMMUNITY
Start: 2023-02-20

## 2023-03-09 RX ORDER — LEVOTHYROXINE SODIUM 0.03 MG/1
TABLET ORAL DAILY
COMMUNITY
Start: 2021-08-03

## 2023-03-09 RX ORDER — HYDROCHLOROTHIAZIDE 12.5 MG/1
TABLET ORAL
COMMUNITY

## 2023-03-09 RX ORDER — GABAPENTIN 100 MG/1
CAPSULE ORAL
COMMUNITY

## 2023-03-09 RX ORDER — AMOXICILLIN AND CLAVULANATE POTASSIUM 875; 125 MG/1; MG/1
1 TABLET, FILM COATED ORAL 2 TIMES DAILY
Qty: 20 TABLET | Refills: 0 | Status: SHIPPED | OUTPATIENT
Start: 2023-03-09 | End: 2023-03-19

## 2023-03-09 RX ORDER — TROSPIUM CHLORIDE 20 MG/1
TABLET, FILM COATED ORAL
COMMUNITY
Start: 2023-02-06

## 2023-03-09 RX ORDER — FLUTICASONE PROPIONATE 50 MCG
1 SPRAY, SUSPENSION (ML) NASAL DAILY
Qty: 32 G | Refills: 1 | Status: SHIPPED | OUTPATIENT
Start: 2023-03-09

## 2023-03-09 SDOH — ECONOMIC STABILITY: HOUSING INSECURITY
IN THE LAST 12 MONTHS, WAS THERE A TIME WHEN YOU DID NOT HAVE A STEADY PLACE TO SLEEP OR SLEPT IN A SHELTER (INCLUDING NOW)?: NO

## 2023-03-09 SDOH — ECONOMIC STABILITY: FOOD INSECURITY: WITHIN THE PAST 12 MONTHS, YOU WORRIED THAT YOUR FOOD WOULD RUN OUT BEFORE YOU GOT MONEY TO BUY MORE.: NEVER TRUE

## 2023-03-09 SDOH — ECONOMIC STABILITY: INCOME INSECURITY: HOW HARD IS IT FOR YOU TO PAY FOR THE VERY BASICS LIKE FOOD, HOUSING, MEDICAL CARE, AND HEATING?: NOT HARD AT ALL

## 2023-03-09 SDOH — ECONOMIC STABILITY: FOOD INSECURITY: WITHIN THE PAST 12 MONTHS, THE FOOD YOU BOUGHT JUST DIDN'T LAST AND YOU DIDN'T HAVE MONEY TO GET MORE.: NEVER TRUE

## 2023-03-09 ASSESSMENT — PATIENT HEALTH QUESTIONNAIRE - PHQ9
5. POOR APPETITE OR OVEREATING: 0
SUM OF ALL RESPONSES TO PHQ QUESTIONS 1-9: 0
8. MOVING OR SPEAKING SO SLOWLY THAT OTHER PEOPLE COULD HAVE NOTICED. OR THE OPPOSITE, BEING SO FIGETY OR RESTLESS THAT YOU HAVE BEEN MOVING AROUND A LOT MORE THAN USUAL: 0
7. TROUBLE CONCENTRATING ON THINGS, SUCH AS READING THE NEWSPAPER OR WATCHING TELEVISION: 0
9. THOUGHTS THAT YOU WOULD BE BETTER OFF DEAD, OR OF HURTING YOURSELF: 0
2. FEELING DOWN, DEPRESSED OR HOPELESS: 0
SUM OF ALL RESPONSES TO PHQ QUESTIONS 1-9: 0
6. FEELING BAD ABOUT YOURSELF - OR THAT YOU ARE A FAILURE OR HAVE LET YOURSELF OR YOUR FAMILY DOWN: 0
10. IF YOU CHECKED OFF ANY PROBLEMS, HOW DIFFICULT HAVE THESE PROBLEMS MADE IT FOR YOU TO DO YOUR WORK, TAKE CARE OF THINGS AT HOME, OR GET ALONG WITH OTHER PEOPLE: 0
SUM OF ALL RESPONSES TO PHQ QUESTIONS 1-9: 0
4. FEELING TIRED OR HAVING LITTLE ENERGY: 0
1. LITTLE INTEREST OR PLEASURE IN DOING THINGS: 0
3. TROUBLE FALLING OR STAYING ASLEEP: 0
SUM OF ALL RESPONSES TO PHQ9 QUESTIONS 1 & 2: 0
SUM OF ALL RESPONSES TO PHQ QUESTIONS 1-9: 0

## 2023-03-09 ASSESSMENT — ENCOUNTER SYMPTOMS
SINUS PAIN: 1
TROUBLE SWALLOWING: 0
CHEST TIGHTNESS: 0
SINUS PRESSURE: 1
EYE REDNESS: 0
NAUSEA: 0
EYE DISCHARGE: 0
COUGH: 1
ABDOMINAL PAIN: 0
SHORTNESS OF BREATH: 0
EYE PAIN: 0
VOMITING: 0
PHOTOPHOBIA: 0
WHEEZING: 0
EYE ITCHING: 0
RHINORRHEA: 1
DIARRHEA: 0
SORE THROAT: 0

## 2023-03-09 ASSESSMENT — VISUAL ACUITY: OU: 1

## 2023-03-09 NOTE — PROGRESS NOTES
Subjective:      Patient ID: Brandon Wade is a 80 y.o. female who present today with:      Chief Complaint   Patient presents with    URI     Cough, congestion sx started Sunday was taking mucinex and not getting better     Symptoms started last Sunday  Occasional cough  Now much worse  Taking mucinex dm  No shortness of breath or wheezing  Burning in her chest at night  Sinus pain, pressure  No fever  No body aches or chills  No GI symptoms    Past Medical History:   Diagnosis Date    Diverticulosis of colon with hemorrhage     DUB (dysfunctional uterine bleeding)     Gastric ulcer     History of basal cell cancer     Hyperlipidemia     Hypertension     Left wrist fracture     Thyroid cyst     Wrist fracture, right      Patient Active Problem List    Diagnosis Date Noted    Personal history of other venous thrombosis and embolism 06/02/2020    Arthritis of knee 06/02/2020    Class 1 obesity 02/24/2020    Essential hypertension 02/24/2020    Hearing loss 02/24/2020    Mild major depression, single episode ([facility] [address] 02/24/2020    Vitamin D deficiency 02/24/2020    Retained orthopedic hardware 02/22/2018    History of colonic polyps 08/09/2017    Urinary incontinence 08/28/2012    Hypercholesterolemia 10/21/2003    Esophageal reflux 10/06/2003    Osteoporosis 10/06/2003    Palpitations 10/06/2003    Symptomatic menopausal or female climacteric states 10/06/2003     Past Surgical History:   Procedure Laterality Date    HYSTERECTOMY, TOTAL ABDOMINAL (CERVIX REMOVED)      THYROIDECTOMY       Social History     Socioeconomic History    Marital status:      Spouse name: None    Number of children: None    Years of education: None    Highest education level: None   Tobacco Use    Smoking status: Never    Smokeless tobacco: Never   Substance and Sexual Activity    Alcohol use: No     Comment: occ.     Drug use: No    Sexual activity: Never     Social Determinants of Health     Financial Resource Strain: Low Risk Difficulty of Paying Living Expenses: Not hard at all   Food Insecurity: No Food Insecurity    Worried About [address] in the Last Year: Never true    Ran Out of Food in the Last Year: Never true   Transportation Needs: Unknown    Lack of Transportation (Non-Medical): No   Housing Stability: Unknown    Unstable Housing in the Last Year: No     Current Outpatient Medications on File Prior to Visit   Medication Sig Dispense Refill    acyclovir (ZOVIRAX) 800 MG tablet acyclovir 800 mg tablet      hydroCHLOROthiazide (HYDRODIURIL) 12.5 MG tablet hydrochlorothiazide 12.5 mg tablet      gabapentin (NEURONTIN) 100 MG capsule gabapentin 100 mg capsule      levothyroxine (SYNTHROID) 25 MCG tablet Take by mouth daily      mirabegron (MYRBETRIQ) 50 MG TB24 Myrbetriq 50 mg tablet,extended release   Take 1 tablet by mouth daily      PARoxetine (PAXIL) 10 MG tablet paroxetine 10 mg tablet      solifenacin (VESICARE) 5 MG tablet TAKE 1 TABLET BY MOUTH EVERY DAY      trospium (SANCTURA) 20 MG tablet TAKE 1 TABLET BY MOUTH DAILY      propranolol (INDERAL LA) 60 MG extended release capsule Take 1 capsule by mouth daily 30 capsule 5    sertraline (ZOLOFT) 25 MG tablet Take 1 tablet by mouth daily 30 tablet 5    fluticasone (FLONASE) 50 MCG/ACT nasal spray use 2 (TWO) sprays IN EACH NOSTRIL DAILY FOR 14 DAYS 16 g 0    famotidine (PEPCID) 20 MG tablet Take by mouth daily       simvastatin (ZOCOR) 40 MG tablet Take 40 mg by mouth nightly       meloxicam (MOBIC) 15 MG tablet 15 mg as needed       losartan (COZAAR) 100 MG tablet Take 100 mg by mouth daily       amoxicillin (AMOXIL) 500 MG capsule TAKE 1 CAPSULE BY MOUTH THREE TIMES DAILY (Patient not taking: Reported on 3/9/2023)       No current facility-administered medications on file prior to visit.      Allergies   Allergen Reactions    Trimethoprim      Facial swelling, itching, generalized redness      Aspirin     Demerol Hcl [Meperidine]     Sulfa Antibiotics       Review of Systems   Constitutional:  Positive for activity change. Negative for appetite change, chills, diaphoresis, fatigue and fever. HENT:  Positive for congestion, rhinorrhea, sinus pressure and sinus pain. Negative for ear pain, mouth sores, sore throat and trouble swallowing. Eyes:  Negative for photophobia, pain, discharge, redness and itching. Respiratory:  Positive for cough. Negative for chest tightness, shortness of breath and wheezing. Cardiovascular:  Negative for chest pain. Gastrointestinal:  Negative for abdominal pain, diarrhea, nausea and vomiting. Genitourinary:  Negative for dysuria, flank pain, frequency, hematuria and urgency. Musculoskeletal:  Negative for arthralgias and myalgias. Skin:  Negative for rash. Neurological:  Negative for seizures, syncope and headaches. Objective:      Vitals:    03/09/23 0907   BP: 120/62   Site: Left Upper Arm   Position: Sitting   Cuff Size: Medium Adult   Pulse: 71   Resp: 16   Temp: 98.1 °F (36.7 °C)   TempSrc: Temporal   SpO2: 97%   Weight: 191 lb 3.2 oz (86.7 kg)   Height: 5' 2\" (1.575 m)     Physical Exam  Constitutional:       General: She is not in acute distress. Appearance: Normal appearance. She is ill-appearing. HENT:      Head: Normocephalic and atraumatic. Right Ear: Hearing, ear canal and external ear normal. A middle ear effusion is present. Left Ear: Hearing, ear canal and external ear normal. A middle ear effusion is present. Nose: Congestion and rhinorrhea present. Rhinorrhea is purulent. Right Sinus: Maxillary sinus tenderness and frontal sinus tenderness present. Left Sinus: Maxillary sinus tenderness and frontal sinus tenderness present. Mouth/Throat:      Lips: Pink. Mouth: Mucous membranes are moist.      Pharynx: Uvula midline. Posterior oropharyngeal erythema present. No pharyngeal swelling, oropharyngeal exudate or uvula swelling. Tonsils: No tonsillar exudate.    Eyes: General: Lids are normal. Vision grossly intact. Extraocular Movements: Extraocular movements intact. Conjunctiva/sclera: Conjunctivae normal.      Pupils: Pupils are equal, round, and reactive to light. Cardiovascular:      Rate and Rhythm: Normal rate and regular rhythm. Heart sounds: Normal heart sounds. Pulmonary:      Effort: Pulmonary effort is normal.      Breath sounds: Normal breath sounds and air entry. Lymphadenopathy:      Head:      Right side of head: No submandibular or tonsillar adenopathy. Left side of head: No submandibular or tonsillar adenopathy. Cervical: Cervical adenopathy present. Skin:     General: Skin is warm and dry. Findings: No rash. Neurological:      Mental Status: She is alert. Assessment & Plan:   Robin Cutler was seen today for uri. Diagnoses and all orders for this visit:    Acute bacterial sinusitis  -     POCT COVID-19, Antigen  -     POCT Influenza A/B  -     amoxicillin-clavulanate (AUGMENTIN) 875-125 MG per tablet; Take 1 tablet by mouth 2 times daily for 10 days  -     fluticasone (FLONASE) 50 MCG/ACT nasal spray; 1 spray by Each Nostril route daily    Side effects, adverse effects of the medication prescribed today, as well as treatment plan/ rationale and result expectations have been discussed with the patient who expresses understanding and desires to proceed. Close follow up to evaluate treatment results and for coordination of care. I have reviewed the patient's medical history in detail and updated the computerized patient record. As always, patient is advised that if symptoms worsen in any way they will proceed to the nearest emergency room.      Follow up in 48-72 hours if symptoms persist or worsen and as needed    SAUL Crawford - CNP

## 2023-06-16 ENCOUNTER — TELEPHONE (OUTPATIENT)
Dept: PRIMARY CARE | Facility: CLINIC | Age: 83
End: 2023-06-16
Payer: MEDICARE

## 2023-06-16 NOTE — TELEPHONE ENCOUNTER
Called patient and lmom to let them know they are eligible for AWV and to schedule apt if interested.     Please forward message to Vanessa if patient scheduled or declined.

## 2023-07-28 PROBLEM — I87.2 VENOUS INSUFFICIENCY: Status: ACTIVE | Noted: 2023-07-28

## 2023-07-28 PROBLEM — E78.2 MIXED HYPERLIPIDEMIA: Status: ACTIVE | Noted: 2023-07-28

## 2023-07-28 PROBLEM — R60.0 LEG EDEMA: Status: ACTIVE | Noted: 2023-07-28

## 2023-07-28 PROBLEM — I10 ESSENTIAL HYPERTENSION: Status: ACTIVE | Noted: 2023-07-28

## 2023-07-28 PROBLEM — M53.3 COCCYDYNIA: Status: ACTIVE | Noted: 2023-07-28

## 2023-07-28 PROBLEM — R32 URINARY INCONTINENCE: Status: ACTIVE | Noted: 2023-07-28

## 2023-07-28 PROBLEM — R31.29 OTHER MICROSCOPIC HEMATURIA: Status: ACTIVE | Noted: 2023-07-28

## 2023-07-28 PROBLEM — F32.0 MILD MAJOR DEPRESSION, SINGLE EPISODE (CMS-HCC): Status: ACTIVE | Noted: 2023-07-28

## 2023-07-28 PROBLEM — I48.91 ATRIAL FIBRILLATION (MULTI): Status: ACTIVE | Noted: 2023-07-28

## 2023-07-28 PROBLEM — E03.9 HYPOTHYROIDISM: Status: ACTIVE | Noted: 2023-07-28

## 2023-08-16 ENCOUNTER — LAB (OUTPATIENT)
Dept: LAB | Facility: LAB | Age: 83
End: 2023-08-16
Payer: COMMERCIAL

## 2023-08-16 ENCOUNTER — OFFICE VISIT (OUTPATIENT)
Dept: PRIMARY CARE | Facility: CLINIC | Age: 83
End: 2023-08-16
Payer: MEDICARE

## 2023-08-16 VITALS
SYSTOLIC BLOOD PRESSURE: 122 MMHG | HEIGHT: 62 IN | WEIGHT: 193 LBS | TEMPERATURE: 97.1 F | RESPIRATION RATE: 16 BRPM | BODY MASS INDEX: 35.51 KG/M2 | DIASTOLIC BLOOD PRESSURE: 70 MMHG | HEART RATE: 74 BPM | OXYGEN SATURATION: 97 %

## 2023-08-16 DIAGNOSIS — E78.2 MIXED HYPERLIPIDEMIA: ICD-10-CM

## 2023-08-16 DIAGNOSIS — I87.2 VENOUS INSUFFICIENCY: ICD-10-CM

## 2023-08-16 DIAGNOSIS — R73.9 HYPERGLYCEMIA: ICD-10-CM

## 2023-08-16 DIAGNOSIS — I48.91 ATRIAL FIBRILLATION, UNSPECIFIED TYPE (MULTI): ICD-10-CM

## 2023-08-16 DIAGNOSIS — F32.0 MILD MAJOR DEPRESSION, SINGLE EPISODE (CMS-HCC): ICD-10-CM

## 2023-08-16 DIAGNOSIS — R31.29 OTHER MICROSCOPIC HEMATURIA: ICD-10-CM

## 2023-08-16 DIAGNOSIS — I10 ESSENTIAL HYPERTENSION: ICD-10-CM

## 2023-08-16 DIAGNOSIS — M17.0 PRIMARY OSTEOARTHRITIS OF BOTH KNEES: ICD-10-CM

## 2023-08-16 DIAGNOSIS — R32 URINARY INCONTINENCE, UNSPECIFIED TYPE: ICD-10-CM

## 2023-08-16 DIAGNOSIS — Z00.00 ROUTINE GENERAL MEDICAL EXAMINATION AT HEALTH CARE FACILITY: Primary | ICD-10-CM

## 2023-08-16 DIAGNOSIS — Z00.00 ROUTINE GENERAL MEDICAL EXAMINATION AT HEALTH CARE FACILITY: ICD-10-CM

## 2023-08-16 DIAGNOSIS — E03.9 HYPOTHYROIDISM, UNSPECIFIED TYPE: ICD-10-CM

## 2023-08-16 DIAGNOSIS — Z00.00 ENCOUNTER FOR MEDICARE ANNUAL WELLNESS EXAM: ICD-10-CM

## 2023-08-16 PROBLEM — Z86.0100 HISTORY OF COLONIC POLYPS: Status: ACTIVE | Noted: 2017-08-09

## 2023-08-16 PROBLEM — Z86.010 HISTORY OF COLONIC POLYPS: Status: ACTIVE | Noted: 2017-08-09

## 2023-08-16 LAB
ALANINE AMINOTRANSFERASE (SGPT) (U/L) IN SER/PLAS: 11 U/L (ref 7–45)
ALBUMIN (G/DL) IN SER/PLAS: 4.5 G/DL (ref 3.4–5)
ALKALINE PHOSPHATASE (U/L) IN SER/PLAS: 65 U/L (ref 33–136)
ANION GAP IN SER/PLAS: 14 MMOL/L (ref 10–20)
ASPARTATE AMINOTRANSFERASE (SGOT) (U/L) IN SER/PLAS: 15 U/L (ref 9–39)
BASOPHILS (10*3/UL) IN BLOOD BY AUTOMATED COUNT: 0.04 X10E9/L (ref 0–0.1)
BASOPHILS/100 LEUKOCYTES IN BLOOD BY AUTOMATED COUNT: 0.8 % (ref 0–2)
BILIRUBIN TOTAL (MG/DL) IN SER/PLAS: 0.8 MG/DL (ref 0–1.2)
CALCIUM (MG/DL) IN SER/PLAS: 9.6 MG/DL (ref 8.6–10.3)
CARBON DIOXIDE, TOTAL (MMOL/L) IN SER/PLAS: 28 MMOL/L (ref 21–32)
CHLORIDE (MMOL/L) IN SER/PLAS: 103 MMOL/L (ref 98–107)
CHOLESTEROL (MG/DL) IN SER/PLAS: 189 MG/DL (ref 0–199)
CHOLESTEROL IN HDL (MG/DL) IN SER/PLAS: 50 MG/DL
CHOLESTEROL/HDL RATIO: 3.8
CREATININE (MG/DL) IN SER/PLAS: 0.83 MG/DL (ref 0.5–1.05)
EOSINOPHILS (10*3/UL) IN BLOOD BY AUTOMATED COUNT: 0.04 X10E9/L (ref 0–0.4)
EOSINOPHILS/100 LEUKOCYTES IN BLOOD BY AUTOMATED COUNT: 0.8 % (ref 0–6)
ERYTHROCYTE DISTRIBUTION WIDTH (RATIO) BY AUTOMATED COUNT: 14 % (ref 11.5–14.5)
ERYTHROCYTE MEAN CORPUSCULAR HEMOGLOBIN CONCENTRATION (G/DL) BY AUTOMATED: 32.8 G/DL (ref 32–36)
ERYTHROCYTE MEAN CORPUSCULAR VOLUME (FL) BY AUTOMATED COUNT: 87 FL (ref 80–100)
ERYTHROCYTES (10*6/UL) IN BLOOD BY AUTOMATED COUNT: 5.23 X10E12/L (ref 4–5.2)
GFR FEMALE: 70 ML/MIN/1.73M2
GLUCOSE (MG/DL) IN SER/PLAS: 97 MG/DL (ref 74–99)
HEMATOCRIT (%) IN BLOOD BY AUTOMATED COUNT: 45.7 % (ref 36–46)
HEMOGLOBIN (G/DL) IN BLOOD: 15 G/DL (ref 12–16)
IMMATURE GRANULOCYTES/100 LEUKOCYTES IN BLOOD BY AUTOMATED COUNT: 0.2 % (ref 0–0.9)
LDL: 105 MG/DL (ref 0–99)
LEUKOCYTES (10*3/UL) IN BLOOD BY AUTOMATED COUNT: 5 X10E9/L (ref 4.4–11.3)
LYMPHOCYTES (10*3/UL) IN BLOOD BY AUTOMATED COUNT: 1.15 X10E9/L (ref 0.8–3)
LYMPHOCYTES/100 LEUKOCYTES IN BLOOD BY AUTOMATED COUNT: 22.8 % (ref 13–44)
MONOCYTES (10*3/UL) IN BLOOD BY AUTOMATED COUNT: 0.39 X10E9/L (ref 0.05–0.8)
MONOCYTES/100 LEUKOCYTES IN BLOOD BY AUTOMATED COUNT: 7.7 % (ref 2–10)
NEUTROPHILS (10*3/UL) IN BLOOD BY AUTOMATED COUNT: 3.41 X10E9/L (ref 1.6–5.5)
NEUTROPHILS/100 LEUKOCYTES IN BLOOD BY AUTOMATED COUNT: 67.7 % (ref 40–80)
PLATELETS (10*3/UL) IN BLOOD AUTOMATED COUNT: 180 X10E9/L (ref 150–450)
POC APPEARANCE, URINE: CLEAR
POC BILIRUBIN, URINE: NEGATIVE
POC BLOOD, URINE: ABNORMAL
POC COLOR, URINE: YELLOW
POC GLUCOSE, URINE: NEGATIVE MG/DL
POC KETONES, URINE: NEGATIVE MG/DL
POC LEUKOCYTES, URINE: ABNORMAL
POC NITRITE,URINE: NEGATIVE
POC PH, URINE: 5.5 PH
POC PROTEIN, URINE: NEGATIVE MG/DL
POC SPECIFIC GRAVITY, URINE: 1.02
POC UROBILINOGEN, URINE: 0.2 EU/DL
POTASSIUM (MMOL/L) IN SER/PLAS: 4 MMOL/L (ref 3.5–5.3)
PROTEIN TOTAL: 6.8 G/DL (ref 6.4–8.2)
SODIUM (MMOL/L) IN SER/PLAS: 141 MMOL/L (ref 136–145)
THYROTROPIN (MIU/L) IN SER/PLAS BY DETECTION LIMIT <= 0.05 MIU/L: 1.86 MIU/L (ref 0.44–3.98)
THYROXINE (T4) FREE (NG/DL) IN SER/PLAS: 1.07 NG/DL (ref 0.61–1.12)
TRIGLYCERIDE (MG/DL) IN SER/PLAS: 169 MG/DL (ref 0–149)
UREA NITROGEN (MG/DL) IN SER/PLAS: 14 MG/DL (ref 6–23)
VLDL: 34 MG/DL (ref 0–40)

## 2023-08-16 PROCEDURE — 3078F DIAST BP <80 MM HG: CPT | Performed by: FAMILY MEDICINE

## 2023-08-16 PROCEDURE — 1036F TOBACCO NON-USER: CPT | Performed by: FAMILY MEDICINE

## 2023-08-16 PROCEDURE — 84443 ASSAY THYROID STIM HORMONE: CPT

## 2023-08-16 PROCEDURE — 81003 URINALYSIS AUTO W/O SCOPE: CPT | Performed by: FAMILY MEDICINE

## 2023-08-16 PROCEDURE — 80053 COMPREHEN METABOLIC PANEL: CPT

## 2023-08-16 PROCEDURE — 1157F ADVNC CARE PLAN IN RCRD: CPT | Performed by: FAMILY MEDICINE

## 2023-08-16 PROCEDURE — 1160F RVW MEDS BY RX/DR IN RCRD: CPT | Performed by: FAMILY MEDICINE

## 2023-08-16 PROCEDURE — 36415 COLL VENOUS BLD VENIPUNCTURE: CPT

## 2023-08-16 PROCEDURE — 1170F FXNL STATUS ASSESSED: CPT | Performed by: FAMILY MEDICINE

## 2023-08-16 PROCEDURE — 80061 LIPID PANEL: CPT

## 2023-08-16 PROCEDURE — 83036 HEMOGLOBIN GLYCOSYLATED A1C: CPT

## 2023-08-16 PROCEDURE — 87186 SC STD MICRODIL/AGAR DIL: CPT

## 2023-08-16 PROCEDURE — 85025 COMPLETE CBC W/AUTO DIFF WBC: CPT

## 2023-08-16 PROCEDURE — G0439 PPPS, SUBSEQ VISIT: HCPCS | Performed by: FAMILY MEDICINE

## 2023-08-16 PROCEDURE — 99213 OFFICE O/P EST LOW 20 MIN: CPT | Performed by: FAMILY MEDICINE

## 2023-08-16 PROCEDURE — 87086 URINE CULTURE/COLONY COUNT: CPT

## 2023-08-16 PROCEDURE — 84439 ASSAY OF FREE THYROXINE: CPT

## 2023-08-16 PROCEDURE — 87077 CULTURE AEROBIC IDENTIFY: CPT

## 2023-08-16 PROCEDURE — 3074F SYST BP LT 130 MM HG: CPT | Performed by: FAMILY MEDICINE

## 2023-08-16 PROCEDURE — 1159F MED LIST DOCD IN RCRD: CPT | Performed by: FAMILY MEDICINE

## 2023-08-16 RX ORDER — CEPHALEXIN 500 MG/1
500 CAPSULE ORAL 3 TIMES DAILY
Qty: 30 CAPSULE | Refills: 0 | Status: SHIPPED | OUTPATIENT
Start: 2023-08-16 | End: 2023-08-26

## 2023-08-16 RX ORDER — FLUTICASONE PROPIONATE 50 MCG
1 SPRAY, SUSPENSION (ML) NASAL
COMMUNITY
Start: 2020-04-20

## 2023-08-16 RX ORDER — PROPRANOLOL HYDROCHLORIDE 10 MG/1
TABLET ORAL
COMMUNITY

## 2023-08-16 RX ORDER — LEVOTHYROXINE SODIUM 25 UG/1
25 TABLET ORAL DAILY
COMMUNITY
End: 2024-01-04

## 2023-08-16 RX ORDER — PROPRANOLOL HYDROCHLORIDE 60 MG/1
60 CAPSULE, EXTENDED RELEASE ORAL DAILY
COMMUNITY
End: 2023-10-11 | Stop reason: SDUPTHER

## 2023-08-16 RX ORDER — SOLIFENACIN SUCCINATE 5 MG/1
5 TABLET, FILM COATED ORAL DAILY
COMMUNITY
End: 2024-01-05 | Stop reason: DRUGHIGH

## 2023-08-16 RX ORDER — OMEPRAZOLE 20 MG/1
20 TABLET, DELAYED RELEASE ORAL
COMMUNITY

## 2023-08-16 RX ORDER — ACETAMINOPHEN 500 MG
TABLET ORAL
COMMUNITY
Start: 2021-07-27 | End: 2023-08-16 | Stop reason: ALTCHOICE

## 2023-08-16 RX ORDER — LOSARTAN POTASSIUM 100 MG/1
100 TABLET ORAL DAILY
COMMUNITY
End: 2023-12-18

## 2023-08-16 RX ORDER — SIMVASTATIN 40 MG/1
40 TABLET, FILM COATED ORAL NIGHTLY
COMMUNITY
End: 2023-11-29

## 2023-08-16 RX ORDER — PREDNISONE 20 MG/1
20 TABLET ORAL DAILY
Qty: 5 TABLET | Refills: 0 | Status: SHIPPED | OUTPATIENT
Start: 2023-08-16 | End: 2023-08-21

## 2023-08-16 RX ORDER — HYDROCHLOROTHIAZIDE 12.5 MG/1
12.5 TABLET ORAL DAILY
COMMUNITY
End: 2024-01-04

## 2023-08-16 RX ORDER — TROSPIUM CHLORIDE 20 MG/1
1 TABLET, FILM COATED ORAL DAILY
COMMUNITY
Start: 2022-05-26 | End: 2023-08-16 | Stop reason: ENTERED-IN-ERROR

## 2023-08-16 RX ORDER — LANOLIN ALCOHOL/MO/W.PET/CERES
CREAM (GRAM) TOPICAL
COMMUNITY
Start: 2021-07-27 | End: 2023-08-16 | Stop reason: ALTCHOICE

## 2023-08-16 RX ORDER — SERTRALINE HYDROCHLORIDE 25 MG/1
25 TABLET, FILM COATED ORAL DAILY
COMMUNITY
End: 2024-03-25

## 2023-08-16 ASSESSMENT — ACTIVITIES OF DAILY LIVING (ADL)
BATHING: INDEPENDENT
DRESSING: INDEPENDENT
DOING_HOUSEWORK: INDEPENDENT
TAKING_MEDICATION: INDEPENDENT
GROCERY_SHOPPING: INDEPENDENT
MANAGING_FINANCES: INDEPENDENT

## 2023-08-16 ASSESSMENT — ENCOUNTER SYMPTOMS
OCCASIONAL FEELINGS OF UNSTEADINESS: 0
LOSS OF SENSATION IN FEET: 0
DEPRESSION: 0

## 2023-08-16 ASSESSMENT — PATIENT HEALTH QUESTIONNAIRE - PHQ9
SUM OF ALL RESPONSES TO PHQ9 QUESTIONS 1 AND 2: 1
10. IF YOU CHECKED OFF ANY PROBLEMS, HOW DIFFICULT HAVE THESE PROBLEMS MADE IT FOR YOU TO DO YOUR WORK, TAKE CARE OF THINGS AT HOME, OR GET ALONG WITH OTHER PEOPLE: NOT DIFFICULT AT ALL
1. LITTLE INTEREST OR PLEASURE IN DOING THINGS: SEVERAL DAYS
2. FEELING DOWN, DEPRESSED OR HOPELESS: NOT AT ALL

## 2023-08-16 NOTE — PROGRESS NOTES
Subjective   Reason for Visit: Sangeeta Vitale is a 83 y.o. female here for a Medicare Wellness visit.   Covid vax: x 5  CRC: 8/2022  Mammogram: 1/2023  Pap: hysterectomy  Lmp: hysterectomy  CHECKLIST REVIEWED AND COMPLETE FOR AMW  Occ r knee pain  Djd  Had cortisone and gel    Past Medical, Surgical, and Family History reviewed and updated in chart.    Reviewed all medications by prescribing practitioner or clinical pharmacist (such as prescriptions, OTCs, herbal therapies and supplements) and documented in the medical record.  Medicare Annual Wellness Visit Subsequent, Hypertension, Hyperlipidemia, Depression, Knee Pain (Right), and Dry Mouth  HPI    Patient Self Assessment of Health Status  Patient Self Assessment: Good    Nutrition and Exercise  Current Diet: HEALTHY Diet always best, minimizing excess carbs  Adequate Fluid Intake: Yes  Caffeine: -aware to minimize intake  Exercise Frequency: Regularly advised    Functional Ability/Level of Safety  Cognitive Impairment Observed: No cognitive impairment observed    Home Safety Risk Factors: None    Patient Care Team:  Ysabel Evans MD as PCP - General    HPI  Patient Active Problem List   Diagnosis    Atrial fibrillation (CMS/HCC)    Coccydynia    Essential hypertension    Hypothyroidism    Leg edema    Mild major depression, single episode (CMS/HCC)    Mixed hyperlipidemia    Other microscopic hematuria    Urinary incontinence    Venous insufficiency    Osteoporosis    History of colonic polyps      Past Surgical History:   Procedure Laterality Date    ACHILLES TENDON SURGERY  2018    COLONOSCOPY W/ POLYPECTOMY  08/2022    tubular adenoma    HYSTERECTOMY  1990    no CA    OTHER SURGICAL HISTORY  2018    cardiac ablation    WRIST FRACTURE SURGERY  1997       Review of Systems no sz mi or cva    This patient has   NO history of recent Covid nor flu symptoms,  NO Fever nor chills,  NO Chest pain, shortness of breath nor paroxysmal nocturnal dyspnea,  NO  "Nausea, vomiting, nor diarrhea,  NO Hematochezia nor melena,  NO Dysuria, hematuria, nor new incontinence issues  NO new severe headaches nor neurological complaints,  NO new issues with anxiety nor depression nor new psychiatric complaints,  NO suicidal nor homicidal ideations.     OBJECTIVE:  /70   Pulse 74   Temp 36.2 °C (97.1 °F) (Temporal)   Resp 16   Ht 1.575 m (5' 2\")   Wt 87.5 kg (193 lb)   LMP  (LMP Unknown)   SpO2 97%   BMI 35.30 kg/m²      General:  alert, oriented, no acute distress.  No obvious skin rashes noted.   No gait disturbance noted.    Mood is pleasant, not tearful, no signs of emotional distress.  Not appearing intoxicated or altered.   No voiced delusions,   Normal, appropriate behavior.    HEENT: Normocephalic, atraumatic,   Pupils round, reactive to light  Extraocular motions intact and wnl  Tympanic membranes normal    Neck: no nuchal rigidity  No masses palpable.  No carotid bruits.  No thyromegaly.    Respiratory: Equal breath sounds  No wheezes,    rales,    nor rhonchi  No respiratory distress.    Heart: Regular rate and rhythm, no    murmurs  no rubs/gallops    Abdomen: no masses palpable, no rebound nor guarding, no rebound nor guarding.    Extremities: NO cyanosis noted, no clubbing.   No edema noted.  2+dorsalis pedis pulses.    Normal-not antalgic, steady gait.    Office Visit on 08/16/2023   Component Date Value Ref Range Status    POC Color, Urine 08/16/2023 Yellow  Straw, Yellow, Light Yellow Final    POC Appearance, Urine 08/16/2023 Clear  Clear Final    POC Specific Gravity, Urine 08/16/2023 1.020  1.005 - 1.035 Final    POC PH, Urine 08/16/2023 5.5  No Reference Range Established PH Final    POC Protein, Urine 08/16/2023 NEGATIVE  NEGATIVE, 30 (1+) mg/dl Final    POC Glucose, Urine 08/16/2023 NEGATIVE  NEGATIVE mg/dl Final    POC Blood, Urine 08/16/2023 SMALL (1+) (A)  NEGATIVE Final    chronic    POC Ketones, Urine 08/16/2023 NEGATIVE  NEGATIVE mg/dl Final    " POC Bilirubin, Urine 08/16/2023 NEGATIVE  NEGATIVE Final    POC Urobilinogen, Urine 08/16/2023 0.2  0.2, 1.0 EU/DL Final    Poc Nitrate, Urine 08/16/2023 NEGATIVE  NEGATIVE Final    POC Leukocytes, Urine 08/16/2023 SMALL (1+) (A)  NEGATIVE Final        Assessment/Plan     Problem List Items Addressed This Visit       Atrial fibrillation (CMS/[facility])    Relevant Medications    propranolol LA (Inderal LA) 60 mg 24 hr capsule    propranolol (Inderal) 10 mg tablet    Other Relevant Orders    CBC and Auto Differential    Comprehensive Metabolic Panel    Hemoglobin A1C    Lipid Panel    Thyroid Stimulating Hormone    Thyroxine, Free    Essential hypertension    Relevant Orders    CBC and Auto Differential    Comprehensive Metabolic Panel    Hemoglobin A1C    Lipid Panel    Thyroid Stimulating Hormone    Thyroxine, Free    Hypothyroidism    Relevant Orders    CBC and Auto Differential    Comprehensive Metabolic Panel    Hemoglobin A1C    Lipid Panel    Thyroid Stimulating Hormone    Thyroxine, Free    Mild major depression, single episode (CMS/[facility])    Relevant Orders    CBC and Auto Differential    Comprehensive Metabolic Panel    Hemoglobin A1C    Lipid Panel    Thyroid Stimulating Hormone    Thyroxine, Free    Mixed hyperlipidemia    Relevant Orders    CBC and Auto Differential    Comprehensive Metabolic Panel    Hemoglobin A1C    Lipid Panel    Thyroid Stimulating Hormone    Thyroxine, Free    Other microscopic hematuria    Relevant Orders    POCT UA Automated manually resulted (Completed)    Urine Culture    CBC and Auto Differential    Comprehensive Metabolic Panel    Hemoglobin A1C    Lipid Panel    Thyroid Stimulating Hormone    Thyroxine, Free    Urinary incontinence    Relevant Orders    POCT UA Automated manually resulted (Completed)    CBC and Auto Differential    Comprehensive Metabolic Panel    Hemoglobin A1C    Lipid Panel    Thyroid Stimulating Hormone    Thyroxine, Free    Venous insufficiency    Relevant  Orders    CBC and Auto Differential    Comprehensive Metabolic Panel    Hemoglobin A1C    Lipid Panel    Thyroid Stimulating Hormone    Thyroxine, Free     Other Visit Diagnoses       Routine general medical examination at health care facility    -  Primary    Relevant Orders    CBC and Auto Differential    Comprehensive Metabolic Panel    Hemoglobin A1C    Lipid Panel    Thyroid Stimulating Hormone    Thyroxine, Free    Encounter for Medicare annual wellness exam        Relevant Orders    CBC and Auto Differential    Comprehensive Metabolic Panel    Hemoglobin A1C    Lipid Panel    Thyroid Stimulating Hormone    Thyroxine, Free    Hyperglycemia        Relevant Orders    Hemoglobin A1C          Advance Care Planning Note   Discussion Date: 08/16/23   Discussion Participants: patient    The patient wishes to discuss Advance Care Planning today and the following is a brief summary of our discussion.     Patient has capacity to make their own medical decisions: Yes  Health Care Agent/Surrogate Decision Maker documented in chart: Yes  Documents on file and valid:  Advance Directive/Living Will: Yes   Health Care Power of : Yes  Communication of Medical Status/Prognosis:   yes   Communication of Treatment Goals/Options:   yes  Treatment Decisions  yes  Time Statement: Total face to face time spent on advance care planning was <30 minutes with <30 minutes spent in counseling, including the explanation.    SEE ME AT NEXT REGULARLY SCHEDULED VISIT-sooner if condition deteriorates or new problems arise.    Labs due  Mamm utd  The patient is aware that results will be forthcoming of ALL planned labs and or tests. If no results are received on my chart or by letter within 1 - 3 weeks, the patient is aware they need to call to obtain results, as this is not usual. Also, if any new conditions arise, or current condition worsens, it is understood that sooner appointment should be made or urgent care/convenient care or  emergency room treatment should be sought depending on severity. Otherwise follow up for recheck at regular intervals as we have discussed, at least yearly.  Empiric keflex will only use if fri cx+ since no sxs now    The risks, benefits, and alternatives for the antibiotic prescribed were discussed with patient as well as possible side effects. If ANY signs or symptoms of allergic reaction patient is to discontinue medicine immediately and call us or GO TO ER if tongue swelling/respiratory issues or significant effects.  It has already been determined that the benefits outweigh the risks of an antibiotic for you at this time, unless otherwise indicated.  Antibiotics are usually meant to be taken to completion as advised and it is usual course for symptoms to IMPROVE while taking-if symptoms worsen or new problems arise, we should be notified or medical evaluation should occur. Persistent side effects such as diarrhea especially after antibiotic discontinuation are unusual, and should prompt assessment and evaluation either in office or ER depending on severity. Rash, and/or other symptoms of concern should also cause evaluation.  Complete resolution of original symptoms are expected and we should be informed if this does not occur.    Full code desired  No depressed or w dementia  Reqs med for travel  [id number] This medications risks, benefits, and alternatives were discussed with patient at length.  If any unwanted side effects occur-discontinue medicine and call the office for discussion.  Will try voltaren gel for knee   Rba of meds given d/w pt and not to take together

## 2023-08-16 NOTE — PROGRESS NOTES
"Subjective   Reason for Visit: Sangeeta Vitale is an 83 y.o. female here for a Medicare Wellness visit.     Past Medical, Surgical, and Family History reviewed and updated in chart.    Reviewed all medications by prescribing practitioner or clinical pharmacist (such as prescriptions, OTCs, herbal therapies and supplements) and documented in the medical record.    HPI    Patient Care Team:  Ysabel Evans MD as PCP - General (Family Medicine)     Review of Systems    Objective   Vitals:  /70   Pulse 74   Temp 36.2 °C (97.1 °F) (Temporal)   Resp 16   Ht 1.575 m (5' 2\")   Wt 87.5 kg (193 lb)   LMP  (LMP Unknown)   SpO2 97%   BMI 35.30 kg/m²       Physical Exam    Assessment/Plan   Problem List Items Addressed This Visit     Atrial fibrillation (CMS/HCC)    Relevant Medications    propranolol LA (Inderal LA) 60 mg 24 hr capsule    propranolol (Inderal) 10 mg tablet    Essential hypertension    Hypothyroidism    Mild major depression, single episode (CMS/HCC)    Mixed hyperlipidemia    Urinary incontinence    Venous insufficiency   Other Visit Diagnoses     Routine general medical examination at health care facility    -  Primary    Encounter for Medicare annual wellness exam                 "

## 2023-08-17 LAB
ESTIMATED AVERAGE GLUCOSE FOR HBA1C: 100 MG/DL
HEMOGLOBIN A1C/HEMOGLOBIN TOTAL IN BLOOD: 5.1 %

## 2023-08-18 ENCOUNTER — TELEPHONE (OUTPATIENT)
Dept: PRIMARY CARE | Facility: CLINIC | Age: 83
End: 2023-08-18
Payer: MEDICARE

## 2023-08-18 NOTE — TELEPHONE ENCOUNTER
Patient had labs done the other day and was told to call in Friday to get the results. Please review and call patient back.

## 2023-08-19 LAB — URINE CULTURE: ABNORMAL

## 2023-09-05 ENCOUNTER — TELEPHONE (OUTPATIENT)
Dept: PRIMARY CARE | Facility: CLINIC | Age: 83
End: 2023-09-05
Payer: MEDICARE

## 2023-09-05 DIAGNOSIS — R32 URINARY INCONTINENCE, UNSPECIFIED TYPE: ICD-10-CM

## 2023-09-05 DIAGNOSIS — N64.4 BREAST PAIN: ICD-10-CM

## 2023-09-05 NOTE — TELEPHONE ENCOUNTER
Urine sample following antibiotic    Please create a lab order - call patient when order placed [phone]    Patient verbalized left breast discomfort and felt lump & tenderness would like an order for diagnostic mammogram

## 2023-09-06 ENCOUNTER — LAB (OUTPATIENT)
Dept: LAB | Facility: LAB | Age: 83
End: 2023-09-06
Payer: COMMERCIAL

## 2023-09-06 DIAGNOSIS — R31.9 HEMATURIA, UNSPECIFIED TYPE: Primary | ICD-10-CM

## 2023-09-06 DIAGNOSIS — R32 URINARY INCONTINENCE, UNSPECIFIED TYPE: ICD-10-CM

## 2023-09-06 LAB
APPEARANCE, URINE: CLEAR
BILIRUBIN, URINE: NEGATIVE
BLOOD, URINE: ABNORMAL
COLOR, URINE: YELLOW
GLUCOSE, URINE: NEGATIVE MG/DL
KETONES, URINE: NEGATIVE MG/DL
LEUKOCYTE ESTERASE, URINE: ABNORMAL
MUCUS, URINE: NORMAL /LPF
NITRITE, URINE: NEGATIVE
PH, URINE: 6 (ref 5–8)
PROTEIN, URINE: NEGATIVE MG/DL
RBC, URINE: 2 /HPF (ref 0–5)
SPECIFIC GRAVITY, URINE: 1.01 (ref 1–1.03)
SQUAMOUS EPITHELIAL CELLS, URINE: 2 /HPF
UROBILINOGEN, URINE: <2 MG/DL (ref 0–1.9)
WBC, URINE: 1 /HPF (ref 0–5)

## 2023-09-06 PROCEDURE — 81001 URINALYSIS AUTO W/SCOPE: CPT

## 2023-09-15 ENCOUNTER — OFFICE VISIT (OUTPATIENT)
Dept: FAMILY MEDICINE CLINIC | Age: 83
End: 2023-09-15
Payer: MEDICARE

## 2023-09-15 VITALS
HEART RATE: 74 BPM | BODY MASS INDEX: 35.77 KG/M2 | WEIGHT: 194.4 LBS | SYSTOLIC BLOOD PRESSURE: 116 MMHG | DIASTOLIC BLOOD PRESSURE: 74 MMHG | OXYGEN SATURATION: 95 % | HEIGHT: 62 IN

## 2023-09-15 DIAGNOSIS — R10.32 ACUTE LEFT LOWER QUADRANT PAIN: Primary | ICD-10-CM

## 2023-09-15 DIAGNOSIS — R35.0 URINARY FREQUENCY: ICD-10-CM

## 2023-09-15 LAB
BILIRUBIN, POC: NORMAL
BLOOD URINE, POC: NORMAL
CLARITY, POC: NORMAL
COLOR, POC: YELLOW
GLUCOSE URINE, POC: NORMAL
KETONES, POC: NORMAL
LEUKOCYTE EST, POC: NORMAL
NITRITE, POC: NORMAL
PH, POC: 6
PROTEIN, POC: NORMAL
SPECIFIC GRAVITY, POC: 1.02
UROBILINOGEN, POC: NORMAL

## 2023-09-15 PROCEDURE — 99214 OFFICE O/P EST MOD 30 MIN: CPT | Performed by: NURSE PRACTITIONER

## 2023-09-15 PROCEDURE — 3078F DIAST BP <80 MM HG: CPT | Performed by: NURSE PRACTITIONER

## 2023-09-15 PROCEDURE — 3074F SYST BP LT 130 MM HG: CPT | Performed by: NURSE PRACTITIONER

## 2023-09-15 PROCEDURE — 81003 URINALYSIS AUTO W/O SCOPE: CPT | Performed by: NURSE PRACTITIONER

## 2023-09-15 PROCEDURE — 1123F ACP DISCUSS/DSCN MKR DOCD: CPT | Performed by: NURSE PRACTITIONER

## 2023-09-15 RX ORDER — OMEPRAZOLE 20 MG/1
20 CAPSULE, DELAYED RELEASE ORAL DAILY
COMMUNITY

## 2023-09-15 RX ORDER — CIPROFLOXACIN 500 MG/1
500 TABLET, FILM COATED ORAL 2 TIMES DAILY
Qty: 14 TABLET | Refills: 0 | Status: SHIPPED | OUTPATIENT
Start: 2023-09-15 | End: 2023-09-22

## 2023-09-15 RX ORDER — METRONIDAZOLE 500 MG/1
500 TABLET ORAL 3 TIMES DAILY
Qty: 21 TABLET | Refills: 0 | Status: SHIPPED | OUTPATIENT
Start: 2023-09-15 | End: 2023-09-22

## 2023-09-15 ASSESSMENT — ENCOUNTER SYMPTOMS
SHORTNESS OF BREATH: 0
ABDOMINAL PAIN: 1
COUGH: 0
CHEST TIGHTNESS: 0
TROUBLE SWALLOWING: 0
VOMITING: 0
BLOOD IN STOOL: 1
DIARRHEA: 0
NAUSEA: 0

## 2023-09-15 NOTE — PROGRESS NOTES
the left lower quadrant with palpation). There is no rebound. Musculoskeletal:         General: No swelling. Normal range of motion. Cervical back: Normal range of motion and neck supple. Lymphadenopathy:      Cervical: No cervical adenopathy. Skin:     General: Skin is warm and dry. Capillary Refill: Capillary refill takes less than 2 seconds. Neurological:      General: No focal deficit present. Mental Status: She is alert and oriented to person, place, and time. Motor: No weakness. Psychiatric:         Mood and Affect: Mood normal.         Behavior: Behavior normal.       Assessment & Plan    Diagnosis Orders   1. Acute left lower quadrant pain  metroNIDAZOLE (FLAGYL) 500 MG tablet   Highly suspicious for diverticular disease/process. We will start on metronidazole along with ciprofloxacin as discussed. Did discuss a liquid diet and advancing slowly with treatment plan. Please notify provider if any new or worsening symptoms were to be noted ciprofloxacin (CIPRO) 500 MG tablet      2. Urinary frequency  POCT Urinalysis No Micro (Auto)   Treated with antibiotics as noted above, likely chronic findings today in office keep follow-up with urologist as scheduled. Culture, Urine        Orders Placed This Encounter   Procedures    Culture, Urine     Standing Status:   Future     Standing Expiration Date:   9/15/2024    POCT Urinalysis No Micro (Auto)     Orders Placed This Encounter   Medications    metroNIDAZOLE (FLAGYL) 500 MG tablet     Sig: Take 1 tablet by mouth 3 times daily for 7 days     Dispense:  21 tablet     Refill:  0    ciprofloxacin (CIPRO) 500 MG tablet     Sig: Take 1 tablet by mouth 2 times daily for 7 days     Dispense:  14 tablet     Refill:  0     There are no discontinued medications. No follow-ups on file. Reviewed with the patient: current clinical status, medications, activities and diet.      Side effects, adverse effects of the medication prescribed

## 2023-09-18 LAB
BACTERIA UR CULT: ABNORMAL
BACTERIA UR CULT: ABNORMAL
ORGANISM: ABNORMAL

## 2023-10-11 ENCOUNTER — TELEPHONE (OUTPATIENT)
Dept: PRIMARY CARE | Facility: CLINIC | Age: 83
End: 2023-10-11
Payer: MEDICARE

## 2023-10-11 DIAGNOSIS — I48.91 ATRIAL FIBRILLATION, UNSPECIFIED TYPE (MULTI): ICD-10-CM

## 2023-10-11 DIAGNOSIS — I10 ESSENTIAL HYPERTENSION: ICD-10-CM

## 2023-10-11 RX ORDER — PROPRANOLOL HYDROCHLORIDE 60 MG/1
60 CAPSULE, EXTENDED RELEASE ORAL DAILY
Qty: 90 CAPSULE | Refills: 3 | Status: SHIPPED | OUTPATIENT
Start: 2023-10-11

## 2023-10-11 NOTE — TELEPHONE ENCOUNTER
Patient phones the office today w/ request to renew her Propranolol 60mg 1 Capsule every day to be sent to [facility] in [place] on [address] [facility] for a 90 days supply.     Thank you.

## 2023-11-03 ENCOUNTER — OFFICE VISIT (OUTPATIENT)
Dept: FAMILY MEDICINE CLINIC | Age: 83
End: 2023-11-03
Payer: MEDICARE

## 2023-11-03 VITALS
OXYGEN SATURATION: 96 % | TEMPERATURE: 97.8 F | HEART RATE: 98 BPM | SYSTOLIC BLOOD PRESSURE: 110 MMHG | WEIGHT: 190 LBS | DIASTOLIC BLOOD PRESSURE: 62 MMHG | BODY MASS INDEX: 34.96 KG/M2 | HEIGHT: 62 IN

## 2023-11-03 DIAGNOSIS — J20.9 ACUTE BRONCHITIS, UNSPECIFIED ORGANISM: ICD-10-CM

## 2023-11-03 DIAGNOSIS — R05.9 COUGH, UNSPECIFIED TYPE: Primary | ICD-10-CM

## 2023-11-03 DIAGNOSIS — H60.512 ACUTE ACTINIC OTITIS EXTERNA OF LEFT EAR: ICD-10-CM

## 2023-11-03 LAB
Lab: NORMAL
PERFORMING INSTRUMENT: NORMAL
QC PASS/FAIL: NORMAL
SARS-COV-2, POC: NORMAL

## 2023-11-03 PROCEDURE — 99213 OFFICE O/P EST LOW 20 MIN: CPT | Performed by: PHYSICIAN ASSISTANT

## 2023-11-03 PROCEDURE — 87426 SARSCOV CORONAVIRUS AG IA: CPT | Performed by: PHYSICIAN ASSISTANT

## 2023-11-03 PROCEDURE — 87804 INFLUENZA ASSAY W/OPTIC: CPT | Performed by: PHYSICIAN ASSISTANT

## 2023-11-03 PROCEDURE — 3074F SYST BP LT 130 MM HG: CPT | Performed by: PHYSICIAN ASSISTANT

## 2023-11-03 PROCEDURE — 3078F DIAST BP <80 MM HG: CPT | Performed by: PHYSICIAN ASSISTANT

## 2023-11-03 PROCEDURE — 1123F ACP DISCUSS/DSCN MKR DOCD: CPT | Performed by: PHYSICIAN ASSISTANT

## 2023-11-03 RX ORDER — OFLOXACIN 3 MG/ML
5 SOLUTION AURICULAR (OTIC) 2 TIMES DAILY
Qty: 5 ML | Refills: 0 | Status: SHIPPED | OUTPATIENT
Start: 2023-11-03 | End: 2023-11-13

## 2023-11-03 RX ORDER — AZITHROMYCIN 250 MG/1
TABLET, FILM COATED ORAL
Qty: 6 TABLET | Refills: 0 | Status: SHIPPED | OUTPATIENT
Start: 2023-11-03 | End: 2023-11-13

## 2023-11-03 ASSESSMENT — ENCOUNTER SYMPTOMS
COUGH: 1
GASTROINTESTINAL NEGATIVE: 1
EYES NEGATIVE: 1

## 2023-11-03 NOTE — PATIENT INSTRUCTIONS
Green tea with honey   Water for hydration   Fermented foods  Align probiotic daily x 3 months   Zinc 50 mg daily over the counter and vitamin d3 5,000 IU daily x 7-10 days

## 2023-11-29 DIAGNOSIS — E78.2 MIXED HYPERLIPIDEMIA: ICD-10-CM

## 2023-11-29 RX ORDER — SIMVASTATIN 40 MG/1
40 TABLET, FILM COATED ORAL NIGHTLY
Qty: 90 TABLET | Refills: 3 | Status: SHIPPED | OUTPATIENT
Start: 2023-11-29 | End: 2024-04-09 | Stop reason: SDUPTHER

## 2023-12-01 ENCOUNTER — OFFICE VISIT (OUTPATIENT)
Dept: FAMILY MEDICINE CLINIC | Age: 83
End: 2023-12-01

## 2023-12-01 VITALS
HEIGHT: 62 IN | BODY MASS INDEX: 35.99 KG/M2 | DIASTOLIC BLOOD PRESSURE: 82 MMHG | SYSTOLIC BLOOD PRESSURE: 118 MMHG | HEART RATE: 83 BPM | WEIGHT: 195.6 LBS | OXYGEN SATURATION: 98 %

## 2023-12-01 DIAGNOSIS — J40 BRONCHITIS: Primary | ICD-10-CM

## 2023-12-01 RX ORDER — AZITHROMYCIN 250 MG/1
250 TABLET, FILM COATED ORAL SEE ADMIN INSTRUCTIONS
Qty: 6 TABLET | Refills: 0 | Status: SHIPPED | OUTPATIENT
Start: 2023-12-01 | End: 2023-12-06

## 2023-12-01 RX ORDER — BENZONATATE 100 MG/1
100 CAPSULE ORAL 2 TIMES DAILY PRN
Qty: 20 CAPSULE | Refills: 0 | Status: SHIPPED | OUTPATIENT
Start: 2023-12-01 | End: 2023-12-11

## 2023-12-01 RX ORDER — METHYLPREDNISOLONE 4 MG/1
TABLET ORAL
Qty: 1 KIT | Refills: 0 | Status: SHIPPED | OUTPATIENT
Start: 2023-12-01

## 2023-12-01 NOTE — PROGRESS NOTES
Subjective  Hali Cushing, 80 y.o. female presents today with:  Chief Complaint   Patient presents with    URI     Onset- a couple weeks   Headache- N  Body aches- N  Ear ache- N  Runny/stuffy nose- N  Problem with smell- N  Problem with taste- N  Sore throat- N  Cough- Y  Scratchy throat-N  Chest symptoms- N  SOB/ OLIVIER- N  Hx of Asthma Chest cold or bronchitis-N  Fever/chills- N  Nausea/ vomiting- N  Gi symptoms- N  COVID exposures- N  Treatments- Cough medicine        I reviewed staff HPI/chief complaint and do agree with above    Patient presents today for concerns of a cough that has been present for approximately 3 to 4 weeks. States she does get some tightness in the upper chest region especially with deep inspiration and a burning type sensation. Cough is very harsh and nonproductive. She denies any shortness of breath/troubles breathing, fever/chills, nasal/upper respiratory symptoms, chest pain/palpitations, or any nausea/vomiting/diarrhea with symptoms. She has trialed over-the-counter cough medications with no benefits and symptoms. Denies any underlying chronic pulmonary conditions          Review of Systems   Constitutional:  Negative for appetite change, chills, fatigue and fever. HENT:  Negative for congestion, ear pain, hearing loss, postnasal drip, rhinorrhea, sinus pressure, sinus pain, sore throat and trouble swallowing. Respiratory:  Positive for cough, chest tightness and wheezing. Negative for shortness of breath. Cardiovascular:  Negative for chest pain and palpitations. Gastrointestinal:  Negative for abdominal pain, diarrhea, nausea and vomiting. Endocrine: Negative for cold intolerance and heat intolerance. Musculoskeletal:  Negative for arthralgias and myalgias. Skin:  Negative for rash. Neurological:  Negative for dizziness, weakness, light-headedness, numbness and headaches. Hematological:  Negative for adenopathy.        Past Medical History:   Diagnosis

## 2023-12-03 ASSESSMENT — ENCOUNTER SYMPTOMS
SINUS PRESSURE: 0
NAUSEA: 0
COUGH: 1
WHEEZING: 1
CHEST TIGHTNESS: 1
SORE THROAT: 0
DIARRHEA: 0
ABDOMINAL PAIN: 0
SINUS PAIN: 0
RHINORRHEA: 0
SHORTNESS OF BREATH: 0
TROUBLE SWALLOWING: 0
VOMITING: 0

## 2023-12-08 ENCOUNTER — OFFICE VISIT (OUTPATIENT)
Dept: FAMILY MEDICINE CLINIC | Age: 83
End: 2023-12-08
Payer: MEDICARE

## 2023-12-08 VITALS
SYSTOLIC BLOOD PRESSURE: 118 MMHG | WEIGHT: 186 LBS | TEMPERATURE: 97.7 F | BODY MASS INDEX: 32.96 KG/M2 | DIASTOLIC BLOOD PRESSURE: 64 MMHG | HEIGHT: 63 IN | HEART RATE: 85 BPM | OXYGEN SATURATION: 96 %

## 2023-12-08 DIAGNOSIS — J06.9 VIRAL URI WITH COUGH: Primary | ICD-10-CM

## 2023-12-08 PROCEDURE — 3074F SYST BP LT 130 MM HG: CPT

## 2023-12-08 PROCEDURE — G8427 DOCREV CUR MEDS BY ELIG CLIN: HCPCS

## 2023-12-08 PROCEDURE — 1090F PRES/ABSN URINE INCON ASSESS: CPT

## 2023-12-08 PROCEDURE — G8417 CALC BMI ABV UP PARAM F/U: HCPCS

## 2023-12-08 PROCEDURE — 1036F TOBACCO NON-USER: CPT

## 2023-12-08 PROCEDURE — 3078F DIAST BP <80 MM HG: CPT

## 2023-12-08 PROCEDURE — G8484 FLU IMMUNIZE NO ADMIN: HCPCS

## 2023-12-08 PROCEDURE — G8400 PT W/DXA NO RESULTS DOC: HCPCS

## 2023-12-08 PROCEDURE — 1123F ACP DISCUSS/DSCN MKR DOCD: CPT

## 2023-12-08 PROCEDURE — 99213 OFFICE O/P EST LOW 20 MIN: CPT

## 2023-12-08 RX ORDER — BENZONATATE 200 MG/1
200 CAPSULE ORAL 3 TIMES DAILY PRN
Qty: 21 CAPSULE | Refills: 0 | Status: SHIPPED | OUTPATIENT
Start: 2023-12-08 | End: 2023-12-15

## 2023-12-08 ASSESSMENT — ENCOUNTER SYMPTOMS
COUGH: 1
SHORTNESS OF BREATH: 0
SORE THROAT: 0
EYE DISCHARGE: 0

## 2023-12-15 ENCOUNTER — OFFICE VISIT (OUTPATIENT)
Dept: FAMILY MEDICINE CLINIC | Age: 83
End: 2023-12-15
Payer: MEDICARE

## 2023-12-15 VITALS
DIASTOLIC BLOOD PRESSURE: 82 MMHG | HEART RATE: 79 BPM | BODY MASS INDEX: 32.96 KG/M2 | OXYGEN SATURATION: 96 % | HEIGHT: 63 IN | TEMPERATURE: 98.1 F | WEIGHT: 186 LBS | SYSTOLIC BLOOD PRESSURE: 124 MMHG

## 2023-12-15 DIAGNOSIS — R05.3 PERSISTENT COUGH FOR 3 WEEKS OR LONGER: Primary | ICD-10-CM

## 2023-12-15 PROCEDURE — G8400 PT W/DXA NO RESULTS DOC: HCPCS | Performed by: NURSE PRACTITIONER

## 2023-12-15 PROCEDURE — 3079F DIAST BP 80-89 MM HG: CPT | Performed by: NURSE PRACTITIONER

## 2023-12-15 PROCEDURE — G8417 CALC BMI ABV UP PARAM F/U: HCPCS | Performed by: NURSE PRACTITIONER

## 2023-12-15 PROCEDURE — 3074F SYST BP LT 130 MM HG: CPT | Performed by: NURSE PRACTITIONER

## 2023-12-15 PROCEDURE — G8484 FLU IMMUNIZE NO ADMIN: HCPCS | Performed by: NURSE PRACTITIONER

## 2023-12-15 PROCEDURE — 1123F ACP DISCUSS/DSCN MKR DOCD: CPT | Performed by: NURSE PRACTITIONER

## 2023-12-15 PROCEDURE — 99213 OFFICE O/P EST LOW 20 MIN: CPT | Performed by: NURSE PRACTITIONER

## 2023-12-15 PROCEDURE — 1036F TOBACCO NON-USER: CPT | Performed by: NURSE PRACTITIONER

## 2023-12-15 PROCEDURE — 1090F PRES/ABSN URINE INCON ASSESS: CPT | Performed by: NURSE PRACTITIONER

## 2023-12-15 PROCEDURE — G8427 DOCREV CUR MEDS BY ELIG CLIN: HCPCS | Performed by: NURSE PRACTITIONER

## 2023-12-15 RX ORDER — BROMPHENIRAMINE MALEATE, PSEUDOEPHEDRINE HYDROCHLORIDE, AND DEXTROMETHORPHAN HYDROBROMIDE 2; 30; 10 MG/5ML; MG/5ML; MG/5ML
5 SYRUP ORAL 4 TIMES DAILY PRN
Qty: 200 ML | Refills: 1 | Status: SHIPPED | OUTPATIENT
Start: 2023-12-15

## 2023-12-15 RX ORDER — DOXYCYCLINE HYCLATE 100 MG
100 TABLET ORAL 2 TIMES DAILY
Qty: 20 TABLET | Refills: 0 | Status: SHIPPED | OUTPATIENT
Start: 2023-12-15 | End: 2023-12-25

## 2023-12-17 DIAGNOSIS — I10 ESSENTIAL (PRIMARY) HYPERTENSION: ICD-10-CM

## 2023-12-18 RX ORDER — LOSARTAN POTASSIUM 100 MG/1
100 TABLET ORAL DAILY
Qty: 90 TABLET | Refills: 3 | Status: SHIPPED | OUTPATIENT
Start: 2023-12-18

## 2024-01-04 ENCOUNTER — TELEPHONE (OUTPATIENT)
Dept: PRIMARY CARE | Facility: CLINIC | Age: 84
End: 2024-01-04
Payer: MEDICARE

## 2024-01-04 DIAGNOSIS — R32 URINARY INCONTINENCE, UNSPECIFIED TYPE: ICD-10-CM

## 2024-01-04 DIAGNOSIS — I10 ESSENTIAL (PRIMARY) HYPERTENSION: ICD-10-CM

## 2024-01-04 DIAGNOSIS — E03.9 HYPOTHYROIDISM, UNSPECIFIED: ICD-10-CM

## 2024-01-04 RX ORDER — HYDROCHLOROTHIAZIDE 12.5 MG/1
12.5 TABLET ORAL DAILY
Qty: 90 TABLET | Refills: 3 | Status: SHIPPED | OUTPATIENT
Start: 2024-01-04 | End: 2024-04-09 | Stop reason: SDUPTHER

## 2024-01-04 RX ORDER — LEVOTHYROXINE SODIUM 25 UG/1
25 TABLET ORAL DAILY
Qty: 60 TABLET | Refills: 3 | Status: SHIPPED | OUTPATIENT
Start: 2024-01-04

## 2024-01-05 RX ORDER — SOLIFENACIN SUCCINATE 10 MG/1
10 TABLET, FILM COATED ORAL DAILY
Qty: 90 TABLET | Refills: 3 | Status: SHIPPED | OUTPATIENT
Start: 2024-01-05 | End: 2025-01-04

## 2024-02-16 ENCOUNTER — APPOINTMENT (OUTPATIENT)
Dept: PRIMARY CARE | Facility: CLINIC | Age: 84
End: 2024-02-16
Payer: MEDICARE

## 2024-02-19 ENCOUNTER — HOSPITAL ENCOUNTER (OUTPATIENT)
Dept: RADIOLOGY | Facility: CLINIC | Age: 84
Discharge: HOME | End: 2024-02-19
Payer: MEDICARE

## 2024-02-19 ENCOUNTER — OFFICE VISIT (OUTPATIENT)
Dept: PRIMARY CARE | Facility: CLINIC | Age: 84
End: 2024-02-19
Payer: MEDICARE

## 2024-02-19 VITALS
TEMPERATURE: 97 F | SYSTOLIC BLOOD PRESSURE: 126 MMHG | HEIGHT: 62 IN | DIASTOLIC BLOOD PRESSURE: 60 MMHG | BODY MASS INDEX: 34.96 KG/M2 | RESPIRATION RATE: 16 BRPM | HEART RATE: 70 BPM | OXYGEN SATURATION: 97 % | WEIGHT: 190 LBS

## 2024-02-19 DIAGNOSIS — Z23 NEED FOR VACCINATION: ICD-10-CM

## 2024-02-19 DIAGNOSIS — I48.91 ATRIAL FIBRILLATION, UNSPECIFIED TYPE (MULTI): ICD-10-CM

## 2024-02-19 DIAGNOSIS — E03.9 HYPOTHYROIDISM, UNSPECIFIED TYPE: ICD-10-CM

## 2024-02-19 DIAGNOSIS — E78.2 MIXED HYPERLIPIDEMIA: ICD-10-CM

## 2024-02-19 DIAGNOSIS — R73.9 HYPERGLYCEMIA: ICD-10-CM

## 2024-02-19 DIAGNOSIS — M17.0 PRIMARY OSTEOARTHRITIS OF BOTH KNEES: ICD-10-CM

## 2024-02-19 DIAGNOSIS — I10 ESSENTIAL HYPERTENSION: ICD-10-CM

## 2024-02-19 DIAGNOSIS — Z87.01 HISTORY OF BACTERIAL PNEUMONIA: ICD-10-CM

## 2024-02-19 DIAGNOSIS — F32.0 MILD MAJOR DEPRESSION, SINGLE EPISODE (CMS-HCC): ICD-10-CM

## 2024-02-19 DIAGNOSIS — Z87.01 HISTORY OF BACTERIAL PNEUMONIA: Primary | ICD-10-CM

## 2024-02-19 DIAGNOSIS — I87.2 VENOUS INSUFFICIENCY: ICD-10-CM

## 2024-02-19 PROCEDURE — 90677 PCV20 VACCINE IM: CPT | Performed by: FAMILY MEDICINE

## 2024-02-19 PROCEDURE — 3078F DIAST BP <80 MM HG: CPT | Performed by: FAMILY MEDICINE

## 2024-02-19 PROCEDURE — 99214 OFFICE O/P EST MOD 30 MIN: CPT | Performed by: FAMILY MEDICINE

## 2024-02-19 PROCEDURE — 1036F TOBACCO NON-USER: CPT | Performed by: FAMILY MEDICINE

## 2024-02-19 PROCEDURE — 71046 X-RAY EXAM CHEST 2 VIEWS: CPT

## 2024-02-19 PROCEDURE — 1159F MED LIST DOCD IN RCRD: CPT | Performed by: FAMILY MEDICINE

## 2024-02-19 PROCEDURE — 71046 X-RAY EXAM CHEST 2 VIEWS: CPT | Performed by: RADIOLOGY

## 2024-02-19 PROCEDURE — 1157F ADVNC CARE PLAN IN RCRD: CPT | Performed by: FAMILY MEDICINE

## 2024-02-19 PROCEDURE — G0009 ADMIN PNEUMOCOCCAL VACCINE: HCPCS | Performed by: FAMILY MEDICINE

## 2024-02-19 PROCEDURE — 3074F SYST BP LT 130 MM HG: CPT | Performed by: FAMILY MEDICINE

## 2024-02-19 RX ORDER — MELOXICAM 15 MG/1
15 TABLET ORAL DAILY
Qty: 30 TABLET | Refills: 3 | Status: SHIPPED | OUTPATIENT
Start: 2024-02-19 | End: 2025-02-18

## 2024-02-19 NOTE — PATIENT INSTRUCTIONS
RSV and Shingles (2nd dose) vaccines are recommended. Medicare prefers you get these done at a pharmacy.

## 2024-02-19 NOTE — PROGRESS NOTES
Covid vax: UTD  Flu: UTD  Tdap: UTD  Pneumo: UTD  RSV: advised  Shingles: advised 2nd dose needed    CRC: 8/2022  Mammogram: 9/2023  Pap: hysterectomy  Lmp: hysterectomy

## 2024-02-19 NOTE — PROGRESS NOTES
"Subjective   Patient ID: Sangeeta Vitale is a 83 y.o. female who presents for Hypertension, Hyperlipidemia, Hypothyroidism, Atrial Fibrillation, and Follow-up (Pt states she had pneumonia over [place]).  Covid vax: UTD  Flu: UTD  Tdap: UTD  Pneumo: UTD  RSV: advised  Shingles: advised 2nd dose needed     CRC: 8/2022  Mammogram: 9/2023  Pap: hysterectomy  Lmp: hysterectomy  HPI  Patient Active Problem List   Diagnosis    Atrial fibrillation (CMS/HCC)    Coccydynia    Essential hypertension    Hypothyroidism    Leg edema    Mild major depression, single episode (CMS/HCC)    Mixed hyperlipidemia    Other microscopic hematuria    Urinary incontinence    Venous insufficiency    Osteoporosis    History of colonic polyps       Past Surgical History:   Procedure Laterality Date    ACHILLES TENDON SURGERY  2018    COLONOSCOPY W/ POLYPECTOMY  08/2022    tubular adenoma    HYSTERECTOMY  1990    no CA    OTHER SURGICAL HISTORY  2018    cardiac ablation    WRIST FRACTURE SURGERY  1997       Review of Systems  This patient has  NO history of seizures/ CAD or CVA    NO history of recent Covid nor flu symptoms,  NO Fever nor chills,  NO Chest pain, shortness of breath nor paroxysmal nocturnal dyspnea,  NO Nausea, vomiting, nor diarrhea,  NO Hematochezia nor melena,  NO Dysuria, hematuria, nor new incontinence issues  NO new severe headaches nor neurological complaints,  NO new issues with anxiety nor depression nor new psychiatric complaints,  NO suicidal nor homicidal ideations.     OBJECTIVE:  /60   Pulse 70   Temp 36.1 °C (97 °F) (Temporal)   Resp 16   Ht 1.575 m (5' 2\")   Wt 86.2 kg (190 lb)   LMP  (LMP Unknown)   SpO2 97%   BMI 34.75 kg/m²      General:  alert, oriented, no acute distress.  No obvious skin rashes noted.   No gait disturbance noted.    Mood is pleasant,  no signs of emotional distress.   Not appearing intoxicated or altered.   No voiced delusions,   Normal, appropriate behavior.    HEENT: " Normocephalic, atraumatic,   Pupils round, reactive to light  Extraocular motions intact and wnl  Tympanic membranes normal    Neck: no nuchal rigidity  No masses palpable.  No carotid bruits.  No thyromegaly.    Respiratory: Equal breath sounds  No wheezes,    rales,    nor rhonchi  No respiratory distress.    Heart: irregular rate and rhythm, no    murmurs  no rubs/gallops    Abdomen: no masses palpable, nontender, no rebound nor guarding.  overwt  Extremities: NO cyanosis noted, no clubbing.   No edema noted.  2+dorsalis pedis pulses.    Normal-not antalgic, steady gait.    No visits with results within 3 Month(s) from this visit.   Latest known visit with results is:   Lab on 09/06/2023   Component Date Value Ref Range Status    Color, Urine 09/06/2023 YELLOW  STRAW,YELLOW Final    Appearance, Urine 09/06/2023 CLEAR  CLEAR Final    Specific Gravity, Urine 09/06/2023 1.014  1.005 - 1.035 Final    pH, Urine 09/06/2023 6.0  5.0 - 8.0 Final    Protein, Urine 09/06/2023 NEGATIVE  NEGATIVE mg/dL Final    Glucose, Urine 09/06/2023 NEGATIVE  NEGATIVE mg/dL Final    Blood, Urine 09/06/2023 SMALL (1+) (A)  NEGATIVE Final    Ketones, Urine 09/06/2023 NEGATIVE  NEGATIVE mg/dL Final    Bilirubin, Urine 09/06/2023 NEGATIVE  NEGATIVE Final    Urobilinogen, Urine 09/06/2023 <2.0  0.0 - 1.9 mg/dL Final    Nitrite, Urine 09/06/2023 NEGATIVE  NEGATIVE Final    Leukocyte Esterase, Urine 09/06/2023 TRACE (A)  NEGATIVE Final    WBC, Urine 09/06/2023 1  0 - 5 /HPF Final    RBC, Urine 09/06/2023 2  0 - 5 /HPF Final    Squamous Epithelial Cells, Urine 09/06/2023 2  /HPF Final    Mucus, Urine 09/06/2023 1+  /LPF Final        Assessment/Plan     Problem List Items Addressed This Visit       Atrial fibrillation (CMS/HCC)    Essential hypertension    Hypothyroidism    Mild major depression, single episode (CMS/HCC)    Mixed hyperlipidemia    Venous insufficiency     Other Visit Diagnoses       Need for vaccination        Relevant Orders     Pneumococcal conjugate vaccine, 20-valent (PREVNAR 20)    Primary osteoarthritis of both knees        Relevant Medications    meloxicam (Mobic) 15 mg tablet          Brief mmse 24-5/30  Memory exercises explained  No dangerous behavior    Follow up at next scheduled visit 6mo and labs then  Cmp cbc lipids hba1c tsh ft4  Cxr today  The patient is aware that results will be forthcoming of ALL planned labs and or tests. If no results are received on my chart or by letter within 1 - 3 weeks, the patient is aware they need to call to obtain results, as this is not usual. Also, if any new conditions arise, or current condition worsens, it is understood that sooner appointment should be made or urgent care/convenient care or emergency room treatment should be sought depending on severity. Otherwise follow up for recheck at regular intervals as we have discussed, at least yearly.  Sees CARDIO 3/2024

## 2024-02-26 DIAGNOSIS — R93.89 ABNORMAL X-RAY: ICD-10-CM

## 2024-03-07 ENCOUNTER — LAB (OUTPATIENT)
Dept: LAB | Facility: LAB | Age: 84
End: 2024-03-07
Payer: MEDICARE

## 2024-03-07 DIAGNOSIS — Z87.01 HISTORY OF BACTERIAL PNEUMONIA: ICD-10-CM

## 2024-03-07 DIAGNOSIS — R73.9 HYPERGLYCEMIA: ICD-10-CM

## 2024-03-07 DIAGNOSIS — F32.0 MILD MAJOR DEPRESSION, SINGLE EPISODE (CMS-HCC): ICD-10-CM

## 2024-03-07 DIAGNOSIS — I10 ESSENTIAL HYPERTENSION: ICD-10-CM

## 2024-03-07 DIAGNOSIS — E03.9 HYPOTHYROIDISM, UNSPECIFIED TYPE: ICD-10-CM

## 2024-03-07 DIAGNOSIS — E78.2 MIXED HYPERLIPIDEMIA: ICD-10-CM

## 2024-03-07 DIAGNOSIS — M17.0 PRIMARY OSTEOARTHRITIS OF BOTH KNEES: ICD-10-CM

## 2024-03-07 DIAGNOSIS — I48.91 ATRIAL FIBRILLATION, UNSPECIFIED TYPE (MULTI): ICD-10-CM

## 2024-03-07 DIAGNOSIS — Z23 NEED FOR VACCINATION: ICD-10-CM

## 2024-03-07 DIAGNOSIS — I87.2 VENOUS INSUFFICIENCY: ICD-10-CM

## 2024-03-07 LAB
ALBUMIN SERPL BCP-MCNC: 4.5 G/DL (ref 3.4–5)
ALP SERPL-CCNC: 68 U/L (ref 33–136)
ALT SERPL W P-5'-P-CCNC: 11 U/L (ref 7–45)
ANION GAP SERPL CALC-SCNC: 13 MMOL/L (ref 10–20)
AST SERPL W P-5'-P-CCNC: 15 U/L (ref 9–39)
BASOPHILS # BLD AUTO: 0.05 X10*3/UL (ref 0–0.1)
BASOPHILS NFR BLD AUTO: 1 %
BILIRUB SERPL-MCNC: 0.9 MG/DL (ref 0–1.2)
BUN SERPL-MCNC: 13 MG/DL (ref 6–23)
CALCIUM SERPL-MCNC: 9.7 MG/DL (ref 8.6–10.3)
CHLORIDE SERPL-SCNC: 102 MMOL/L (ref 98–107)
CHOLEST SERPL-MCNC: 160 MG/DL (ref 0–199)
CHOLESTEROL/HDL RATIO: 3.4
CO2 SERPL-SCNC: 30 MMOL/L (ref 21–32)
CREAT SERPL-MCNC: 0.81 MG/DL (ref 0.5–1.05)
EGFRCR SERPLBLD CKD-EPI 2021: 72 ML/MIN/1.73M*2
EOSINOPHIL # BLD AUTO: 0.04 X10*3/UL (ref 0–0.4)
EOSINOPHIL NFR BLD AUTO: 0.8 %
ERYTHROCYTE [DISTWIDTH] IN BLOOD BY AUTOMATED COUNT: 13.4 % (ref 11.5–14.5)
EST. AVERAGE GLUCOSE BLD GHB EST-MCNC: 97 MG/DL
GLUCOSE SERPL-MCNC: 100 MG/DL (ref 74–99)
HBA1C MFR BLD: 5 %
HCT VFR BLD AUTO: 47.8 % (ref 36–46)
HDLC SERPL-MCNC: 47.6 MG/DL
HGB BLD-MCNC: 15.1 G/DL (ref 12–16)
IMM GRANULOCYTES # BLD AUTO: 0.02 X10*3/UL (ref 0–0.5)
IMM GRANULOCYTES NFR BLD AUTO: 0.4 % (ref 0–0.9)
LDLC SERPL CALC-MCNC: 82 MG/DL
LYMPHOCYTES # BLD AUTO: 1.15 X10*3/UL (ref 0.8–3)
LYMPHOCYTES NFR BLD AUTO: 22.8 %
MCH RBC QN AUTO: 28.1 PG (ref 26–34)
MCHC RBC AUTO-ENTMCNC: 31.6 G/DL (ref 32–36)
MCV RBC AUTO: 89 FL (ref 80–100)
MONOCYTES # BLD AUTO: 0.42 X10*3/UL (ref 0.05–0.8)
MONOCYTES NFR BLD AUTO: 8.3 %
NEUTROPHILS # BLD AUTO: 3.36 X10*3/UL (ref 1.6–5.5)
NEUTROPHILS NFR BLD AUTO: 66.7 %
NON HDL CHOLESTEROL: 112 MG/DL (ref 0–149)
NRBC BLD-RTO: 0 /100 WBCS (ref 0–0)
PLATELET # BLD AUTO: 173 X10*3/UL (ref 150–450)
POTASSIUM SERPL-SCNC: 4 MMOL/L (ref 3.5–5.3)
PROT SERPL-MCNC: 6.7 G/DL (ref 6.4–8.2)
RBC # BLD AUTO: 5.38 X10*6/UL (ref 4–5.2)
SODIUM SERPL-SCNC: 141 MMOL/L (ref 136–145)
T4 FREE SERPL-MCNC: 0.83 NG/DL (ref 0.61–1.12)
TRIGL SERPL-MCNC: 153 MG/DL (ref 0–149)
TSH SERPL-ACNC: 1.95 MIU/L (ref 0.44–3.98)
VLDL: 31 MG/DL (ref 0–40)
WBC # BLD AUTO: 5 X10*3/UL (ref 4.4–11.3)

## 2024-03-07 PROCEDURE — 80061 LIPID PANEL: CPT

## 2024-03-07 PROCEDURE — 80053 COMPREHEN METABOLIC PANEL: CPT

## 2024-03-07 PROCEDURE — 36415 COLL VENOUS BLD VENIPUNCTURE: CPT

## 2024-03-07 PROCEDURE — 85025 COMPLETE CBC W/AUTO DIFF WBC: CPT

## 2024-03-07 PROCEDURE — 84439 ASSAY OF FREE THYROXINE: CPT

## 2024-03-07 PROCEDURE — 83036 HEMOGLOBIN GLYCOSYLATED A1C: CPT

## 2024-03-07 PROCEDURE — 84443 ASSAY THYROID STIM HORMONE: CPT

## 2024-03-22 ENCOUNTER — HOSPITAL ENCOUNTER (OUTPATIENT)
Dept: RADIOLOGY | Facility: HOSPITAL | Age: 84
Discharge: HOME | End: 2024-03-22
Payer: MEDICARE

## 2024-03-22 DIAGNOSIS — R93.89 ABNORMAL X-RAY: ICD-10-CM

## 2024-03-22 PROCEDURE — 71250 CT THORAX DX C-: CPT | Performed by: RADIOLOGY

## 2024-03-22 PROCEDURE — 71250 CT THORAX DX C-: CPT

## 2024-03-23 DIAGNOSIS — F32.0 MAJOR DEPRESSIVE DISORDER, SINGLE EPISODE, MILD (CMS-HCC): ICD-10-CM

## 2024-03-25 RX ORDER — SERTRALINE HYDROCHLORIDE 25 MG/1
25 TABLET, FILM COATED ORAL DAILY
Qty: 90 TABLET | Refills: 3 | Status: SHIPPED | OUTPATIENT
Start: 2024-03-25

## 2024-04-09 ENCOUNTER — OFFICE VISIT (OUTPATIENT)
Dept: PRIMARY CARE | Facility: CLINIC | Age: 84
End: 2024-04-09
Payer: MEDICARE

## 2024-04-09 VITALS
RESPIRATION RATE: 16 BRPM | BODY MASS INDEX: 34.75 KG/M2 | SYSTOLIC BLOOD PRESSURE: 118 MMHG | OXYGEN SATURATION: 97 % | TEMPERATURE: 97.1 F | DIASTOLIC BLOOD PRESSURE: 60 MMHG | HEIGHT: 62 IN | HEART RATE: 70 BPM

## 2024-04-09 DIAGNOSIS — I87.2 VENOUS INSUFFICIENCY: ICD-10-CM

## 2024-04-09 DIAGNOSIS — I48.91 ATRIAL FIBRILLATION, UNSPECIFIED TYPE (MULTI): ICD-10-CM

## 2024-04-09 DIAGNOSIS — I10 ESSENTIAL HYPERTENSION: ICD-10-CM

## 2024-04-09 DIAGNOSIS — B37.2 CANDIDAL INTERTRIGO: Primary | ICD-10-CM

## 2024-04-09 DIAGNOSIS — E78.2 MIXED HYPERLIPIDEMIA: ICD-10-CM

## 2024-04-09 DIAGNOSIS — F32.0 MILD MAJOR DEPRESSION, SINGLE EPISODE (CMS-HCC): ICD-10-CM

## 2024-04-09 DIAGNOSIS — E03.9 HYPOTHYROIDISM, UNSPECIFIED TYPE: ICD-10-CM

## 2024-04-09 DIAGNOSIS — I10 ESSENTIAL (PRIMARY) HYPERTENSION: ICD-10-CM

## 2024-04-09 PROCEDURE — 3078F DIAST BP <80 MM HG: CPT | Performed by: FAMILY MEDICINE

## 2024-04-09 PROCEDURE — 1159F MED LIST DOCD IN RCRD: CPT | Performed by: FAMILY MEDICINE

## 2024-04-09 PROCEDURE — 1036F TOBACCO NON-USER: CPT | Performed by: FAMILY MEDICINE

## 2024-04-09 PROCEDURE — 3074F SYST BP LT 130 MM HG: CPT | Performed by: FAMILY MEDICINE

## 2024-04-09 PROCEDURE — 99213 OFFICE O/P EST LOW 20 MIN: CPT | Performed by: FAMILY MEDICINE

## 2024-04-09 PROCEDURE — 1157F ADVNC CARE PLAN IN RCRD: CPT | Performed by: FAMILY MEDICINE

## 2024-04-09 RX ORDER — KETOCONAZOLE 20 MG/G
CREAM TOPICAL 2 TIMES DAILY
Qty: 60 G | Refills: 2 | Status: SHIPPED | OUTPATIENT
Start: 2024-04-09 | End: 2025-04-09

## 2024-04-09 RX ORDER — FLUCONAZOLE 100 MG/1
100 TABLET ORAL DAILY
Qty: 7 TABLET | Refills: 1 | Status: SHIPPED | OUTPATIENT
Start: 2024-04-09 | End: 2024-04-25 | Stop reason: SDUPTHER

## 2024-04-09 RX ORDER — SIMVASTATIN 40 MG/1
40 TABLET, FILM COATED ORAL NIGHTLY
Qty: 90 TABLET | Refills: 3 | Status: SHIPPED | OUTPATIENT
Start: 2024-04-09

## 2024-04-09 RX ORDER — HYDROCHLOROTHIAZIDE 12.5 MG/1
12.5 TABLET ORAL DAILY
Qty: 90 TABLET | Refills: 3 | Status: SHIPPED | OUTPATIENT
Start: 2024-04-09

## 2024-04-09 NOTE — PROGRESS NOTES
"Subjective   Patient ID: Sangeeta Vitale is a 83 y.o. female who presents for Rash (Groin and under breasts x 1 week--itches/Has used otc caladryl).  ACUTE VISIT used otc not helpful    All lab good  No dm2  Sxs- itchy       HPI  Patient Active Problem List   Diagnosis    Atrial fibrillation (CMS/HCC)    Coccydynia    Essential hypertension    Hypothyroidism    Leg edema    Mild major depression, single episode (CMS/HCC)    Mixed hyperlipidemia    Other microscopic hematuria    Urinary incontinence    Venous insufficiency    Osteoporosis    History of colonic polyps       Past Surgical History:   Procedure Laterality Date    ACHILLES TENDON SURGERY  2018    COLONOSCOPY W/ POLYPECTOMY  08/2022    tubular adenoma    HYSTERECTOMY  1990    no CA    OTHER SURGICAL HISTORY  2018    cardiac ablation    WRIST FRACTURE SURGERY  1997       Review of Systems no sz mi or cva    This patient has     NO CONFIRMED COVID OR FLU    NO Fever nor chills,  NO Chest pain, shortness of breath nor paroxysmal nocturnal dyspnea,  NO Nausea, vomiting, nor diarrhea,  NO Hematochezia nor melena,  NO Dysuria, hematuria, nor new incontinence issues  NO new severe headaches nor neurological complaints,  NO new issues with anxiety nor depression nor new psychiatric complaints,  NO suicidal nor homicidal ideations.     OBJECTIVE:  /60   Pulse 70   Temp 36.2 °C (97.1 °F) (Temporal)   Resp 16   Ht 1.575 m (5' 2\")   LMP  (LMP Unknown)   SpO2 97%   BMI 34.75 kg/m²      General:  alert, oriented, no acute distress. Appears slightly suboptimal to baseline-    Erythematous rash large area under breasts and intertriginous groin bilat  5-7cm each area  No cellulitis    Mood is pleasant, not tearful, no signs of emotional distress.  Not appearing intoxicated or altered.   No voiced delusions,   Normal, appropriate behavior.    HEENT: Normocephalic, atraumatic,   Pupils round, reactive to light  Extraocular motions intact and wnl     Tympanic " membranes normal    Neck: no nuchal rigidity  No masses palpable.  No carotid bruits.  No thyromegaly.    Respiratory: Equal breath sounds    No wheezes,    rales,    nor rhonchi  No respiratory distress.    Heart: Regular rate and rhythm, no    murmurs  no rubs/gallops    Abdomen: no masses palpable, nontender, no rebound nor guarding.  overwt  Extremities: NO cyanosis noted, no clubbing.   No edema noted.  2+dorsalis pedis pulses.    Normal-not antalgic, steady gait.      Assessment/Plan     Problem List Items Addressed This Visit       Atrial fibrillation (CMS/HCC)    Essential hypertension    Hypothyroidism    Mild major depression, single episode (CMS/HCC)    Mixed hyperlipidemia    Relevant Medications    simvastatin (Zocor) 40 mg tablet    Venous insufficiency     Other Visit Diagnoses       Essential (primary) hypertension        Relevant Medications    hydroCHLOROthiazide (Microzide) 12.5 mg tablet            Plan-supportive care  And  Fluconazole x7d  And ketoconazole  If not gone 2wks appt  Low carb diet      If symptoms worsen patient needs to GO TO ER for evaluation.  EXPECTED COURSE IF FULL RESOLUTION OF SYMPTOMS in next 1week-if not happening needs to at least call to inform and follow up appt to be made.  SOONER TO ER if condition deterioration.    If medicine is prescribed and over the counters advised-any unusual or unexpected side effects should be reported and med stopped until further discussion.   TO ER IF serious effects such as -but not limited to-throat swelling-shortness of breath-chest pain -unrelenting fever-etc.

## 2024-04-09 NOTE — PROGRESS NOTES
Called patient~  Patient states that PMD had to change her schedule time. She states the times she was given to change her appointment to, she feels is to close to her surgery date.   Advised patient that I could get her in Monday, May 1 2017.   Patient states she will be able to make that day and time.      Covid vax: UTD  Flu: UTD  Tdap: UTD  Pneumo: UTD  RSV: advised  Shingles: advised 2nd dose needed

## 2024-04-25 DIAGNOSIS — B37.2 CANDIDAL INTERTRIGO: ICD-10-CM

## 2024-04-25 RX ORDER — FLUCONAZOLE 100 MG/1
100 TABLET ORAL DAILY
Qty: 7 TABLET | Refills: 0 | Status: SHIPPED | OUTPATIENT
Start: 2024-04-25 | End: 2024-05-02

## 2024-05-15 ENCOUNTER — TELEPHONE (OUTPATIENT)
Dept: FAMILY MEDICINE CLINIC | Age: 84
End: 2024-05-15

## 2024-05-15 NOTE — TELEPHONE ENCOUNTER
Pt declined follow up appt with provider, pt is not a [facility] patient sees a provider through the [facility]

## 2024-08-07 ENCOUNTER — OFFICE VISIT (OUTPATIENT)
Dept: FAMILY MEDICINE CLINIC | Age: 84
End: 2024-08-07
Payer: MEDICARE

## 2024-08-07 VITALS
OXYGEN SATURATION: 97 % | SYSTOLIC BLOOD PRESSURE: 108 MMHG | WEIGHT: 178.8 LBS | BODY MASS INDEX: 31.68 KG/M2 | HEART RATE: 65 BPM | TEMPERATURE: 97 F | DIASTOLIC BLOOD PRESSURE: 70 MMHG | HEIGHT: 63 IN

## 2024-08-07 DIAGNOSIS — R05.9 COUGH, UNSPECIFIED TYPE: Primary | ICD-10-CM

## 2024-08-07 DIAGNOSIS — F32.0 MILD MAJOR DEPRESSION, SINGLE EPISODE (HCC): ICD-10-CM

## 2024-08-07 DIAGNOSIS — H61.22 LEFT EAR IMPACTED CERUMEN: ICD-10-CM

## 2024-08-07 PROBLEM — H90.3 SENSORINEURAL HEARING LOSS, BILATERAL: Status: ACTIVE | Noted: 2020-07-13

## 2024-08-07 PROBLEM — M67.01 ACQUIRED SHORT ACHILLES TENDON OF RIGHT LOWER EXTREMITY: Status: ACTIVE | Noted: 2018-07-24

## 2024-08-07 PROBLEM — H93.13 TINNITUS, BILATERAL: Status: ACTIVE | Noted: 2020-07-13

## 2024-08-07 PROBLEM — K59.00 CONSTIPATION: Status: ACTIVE | Noted: 2024-08-07

## 2024-08-07 PROBLEM — M20.42 ACQUIRED HAMMER TOE DEFORMITY OF LESSER TOE OF LEFT FOOT: Status: ACTIVE | Noted: 2019-06-27

## 2024-08-07 PROBLEM — R73.9 HYPERGLYCEMIA: Status: ACTIVE | Noted: 2023-08-16

## 2024-08-07 PROBLEM — I48.91 ATRIAL FIBRILLATION (HCC): Status: ACTIVE | Noted: 2023-07-28

## 2024-08-07 PROBLEM — J31.0 CHRONIC RHINITIS: Status: ACTIVE | Noted: 2024-08-07

## 2024-08-07 PROBLEM — R31.29 OTHER MICROSCOPIC HEMATURIA: Status: ACTIVE | Noted: 2022-10-04

## 2024-08-07 PROBLEM — K14.0 GLOSSITIS: Status: ACTIVE | Noted: 2024-08-07

## 2024-08-07 PROBLEM — R26.2 WALKING DIFFICULTY DUE TO ANKLE AND FOOT: Status: ACTIVE | Noted: 2018-05-22

## 2024-08-07 PROBLEM — I73.9 PERIPHERAL VASCULAR DISEASE (HCC): Status: ACTIVE | Noted: 2018-03-13

## 2024-08-07 PROBLEM — M24.573 EQUINUS CONTRACTURE OF ANKLE: Status: ACTIVE | Noted: 2018-09-26

## 2024-08-07 PROBLEM — B35.1 ONYCHOMYCOSIS: Status: ACTIVE | Noted: 2018-03-13

## 2024-08-07 PROBLEM — M25.473 ANKLE EDEMA: Status: ACTIVE | Noted: 2018-03-08

## 2024-08-07 PROBLEM — M76.60 INSERTIONAL ACHILLES TENDINOPATHY: Status: ACTIVE | Noted: 2018-03-08

## 2024-08-07 PROBLEM — M72.2 PLANTAR FASCIITIS OF RIGHT FOOT: Status: ACTIVE | Noted: 2018-05-22

## 2024-08-07 PROBLEM — K57.90 DIVERTICULAR DISEASE: Status: ACTIVE | Noted: 2024-08-07

## 2024-08-07 PROBLEM — M53.3 COCCYDYNIA: Status: ACTIVE | Noted: 2023-07-28

## 2024-08-07 PROBLEM — I87.2 VENOUS INSUFFICIENCY: Status: ACTIVE | Noted: 2023-07-28

## 2024-08-07 PROBLEM — E03.9 HYPOTHYROIDISM: Status: ACTIVE | Noted: 2023-07-28

## 2024-08-07 PROBLEM — M17.0 PRIMARY OSTEOARTHRITIS OF BOTH KNEES: Status: ACTIVE | Noted: 2024-08-07

## 2024-08-07 LAB
Lab: NORMAL
PERFORMING INSTRUMENT: NORMAL
QC PASS/FAIL: NORMAL
SARS-COV-2, POC: NORMAL

## 2024-08-07 PROCEDURE — G8417 CALC BMI ABV UP PARAM F/U: HCPCS | Performed by: FAMILY MEDICINE

## 2024-08-07 PROCEDURE — 99214 OFFICE O/P EST MOD 30 MIN: CPT | Performed by: FAMILY MEDICINE

## 2024-08-07 PROCEDURE — 87426 SARSCOV CORONAVIRUS AG IA: CPT | Performed by: FAMILY MEDICINE

## 2024-08-07 PROCEDURE — G8400 PT W/DXA NO RESULTS DOC: HCPCS | Performed by: FAMILY MEDICINE

## 2024-08-07 PROCEDURE — 1090F PRES/ABSN URINE INCON ASSESS: CPT | Performed by: FAMILY MEDICINE

## 2024-08-07 PROCEDURE — G8427 DOCREV CUR MEDS BY ELIG CLIN: HCPCS | Performed by: FAMILY MEDICINE

## 2024-08-07 PROCEDURE — 1036F TOBACCO NON-USER: CPT | Performed by: FAMILY MEDICINE

## 2024-08-07 PROCEDURE — 3074F SYST BP LT 130 MM HG: CPT | Performed by: FAMILY MEDICINE

## 2024-08-07 PROCEDURE — 3078F DIAST BP <80 MM HG: CPT | Performed by: FAMILY MEDICINE

## 2024-08-07 PROCEDURE — 1123F ACP DISCUSS/DSCN MKR DOCD: CPT | Performed by: FAMILY MEDICINE

## 2024-08-07 RX ORDER — SOLIFENACIN SUCCINATE 10 MG/1
10 TABLET, FILM COATED ORAL DAILY
COMMUNITY
Start: 2024-05-13

## 2024-08-07 RX ORDER — BENZONATATE 100 MG/1
100 CAPSULE ORAL 3 TIMES DAILY PRN
Qty: 30 CAPSULE | Refills: 0 | Status: SHIPPED | OUTPATIENT
Start: 2024-08-07 | End: 2024-08-17

## 2024-08-07 SDOH — ECONOMIC STABILITY: INCOME INSECURITY: HOW HARD IS IT FOR YOU TO PAY FOR THE VERY BASICS LIKE FOOD, HOUSING, MEDICAL CARE, AND HEATING?: NOT HARD AT ALL

## 2024-08-07 SDOH — ECONOMIC STABILITY: FOOD INSECURITY: WITHIN THE PAST 12 MONTHS, THE FOOD YOU BOUGHT JUST DIDN'T LAST AND YOU DIDN'T HAVE MONEY TO GET MORE.: NEVER TRUE

## 2024-08-07 SDOH — ECONOMIC STABILITY: FOOD INSECURITY: WITHIN THE PAST 12 MONTHS, YOU WORRIED THAT YOUR FOOD WOULD RUN OUT BEFORE YOU GOT MONEY TO BUY MORE.: NEVER TRUE

## 2024-08-07 ASSESSMENT — PATIENT HEALTH QUESTIONNAIRE - PHQ9
4. FEELING TIRED OR HAVING LITTLE ENERGY: NOT AT ALL
9. THOUGHTS THAT YOU WOULD BE BETTER OFF DEAD, OR OF HURTING YOURSELF: NOT AT ALL
2. FEELING DOWN, DEPRESSED OR HOPELESS: NOT AT ALL
7. TROUBLE CONCENTRATING ON THINGS, SUCH AS READING THE NEWSPAPER OR WATCHING TELEVISION: NOT AT ALL
SUM OF ALL RESPONSES TO PHQ QUESTIONS 1-9: 0
8. MOVING OR SPEAKING SO SLOWLY THAT OTHER PEOPLE COULD HAVE NOTICED. OR THE OPPOSITE, BEING SO FIGETY OR RESTLESS THAT YOU HAVE BEEN MOVING AROUND A LOT MORE THAN USUAL: NOT AT ALL
SUM OF ALL RESPONSES TO PHQ QUESTIONS 1-9: 0
5. POOR APPETITE OR OVEREATING: NOT AT ALL
10. IF YOU CHECKED OFF ANY PROBLEMS, HOW DIFFICULT HAVE THESE PROBLEMS MADE IT FOR YOU TO DO YOUR WORK, TAKE CARE OF THINGS AT HOME, OR GET ALONG WITH OTHER PEOPLE: NOT DIFFICULT AT ALL
6. FEELING BAD ABOUT YOURSELF - OR THAT YOU ARE A FAILURE OR HAVE LET YOURSELF OR YOUR FAMILY DOWN: NOT AT ALL
SUM OF ALL RESPONSES TO PHQ QUESTIONS 1-9: 0
3. TROUBLE FALLING OR STAYING ASLEEP: NOT AT ALL
1. LITTLE INTEREST OR PLEASURE IN DOING THINGS: NOT AT ALL
SUM OF ALL RESPONSES TO PHQ9 QUESTIONS 1 & 2: 0
SUM OF ALL RESPONSES TO PHQ QUESTIONS 1-9: 0

## 2024-08-07 ASSESSMENT — ENCOUNTER SYMPTOMS
COUGH: 1
SHORTNESS OF BREATH: 0
ABDOMINAL DISTENTION: 0
PHOTOPHOBIA: 0
CHEST TIGHTNESS: 0
ABDOMINAL PAIN: 0

## 2024-08-07 NOTE — PROGRESS NOTES
Diagnosis Orders   1. Cough, unspecified type  POCT COVID-19, Antigen    benzonatate (TESSALON) 100 MG capsule      2. Mild major depression, single episode (HCC)        3. Left ear impacted cerumen          Return if symptoms worsen or fail to improve.  Patient Instructions   Patient will continue Mucinex DM twice a day and will add Tessalon Perles for cough.    No evidence of COVID today.    Medications are all up-to-date and depressive symptoms well-controlled with sertraline    Subjective:      Patient ID: Catalina Lagunas is a 84 y.o. female who presents for:  Chief Complaint   Patient presents with    Establish Care     Pt has concerns regarding blocked ears.     URI     Pt has cough but swallows whatever she produces. Runny nose. Denies body ache, fever, or chills. Denies being around anyone that was sick. Sx started a week ago.     Alopecia     Pt states she has noticed she is losing her hair.        Patient notes she is mostly experiencing cough with thick mucus that will not move.  Denies fever or chills.  Is noticing sensation in her areas of fullness.  Is taking Mucinex DM twice a day.        Current Outpatient Medications on File Prior to Visit   Medication Sig Dispense Refill    solifenacin (VESICARE) 10 MG tablet Take 1 tablet by mouth daily      dextromethorphan-guaiFENesin (MUCINEX DM)  MG per extended release tablet Take 1 tablet by mouth every 12 hours as needed for Cough      brompheniramine-pseudoephedrine-DM 2-30-10 MG/5ML syrup Take 5 mLs by mouth 4 times daily as needed for Congestion or Cough 200 mL 1    omeprazole (PRILOSEC) 20 MG delayed release capsule Take 1 capsule by mouth daily      hydroCHLOROthiazide (HYDRODIURIL) 12.5 MG tablet hydrochlorothiazide 12.5 mg tablet      levothyroxine (SYNTHROID) 25 MCG tablet Take by mouth daily      propranolol (INDERAL LA) 60 MG extended release capsule Take 1 capsule by mouth daily 30 capsule 5    sertraline (ZOLOFT) 25 MG tablet Take 1

## 2024-08-07 NOTE — PATIENT INSTRUCTIONS
Patient will continue Mucinex DM twice a day and will add Tessalon Perles for cough.    No evidence of COVID today.    Medications are all up-to-date and depressive symptoms well-controlled with sertraline

## 2024-08-19 ENCOUNTER — APPOINTMENT (OUTPATIENT)
Dept: PRIMARY CARE | Facility: CLINIC | Age: 84
End: 2024-08-19
Payer: MEDICARE

## 2024-08-21 ENCOUNTER — TELEPHONE (OUTPATIENT)
Dept: FAMILY MEDICINE CLINIC | Age: 84
End: 2024-08-21

## 2024-08-21 ENCOUNTER — APPOINTMENT (OUTPATIENT)
Dept: PRIMARY CARE | Facility: CLINIC | Age: 84
End: 2024-08-21
Payer: MEDICARE

## 2024-09-24 ENCOUNTER — APPOINTMENT (OUTPATIENT)
Dept: PRIMARY CARE | Facility: CLINIC | Age: 84
End: 2024-09-24
Payer: MEDICARE

## 2024-09-24 DIAGNOSIS — E03.9 HYPOTHYROIDISM, UNSPECIFIED: ICD-10-CM

## 2024-09-24 RX ORDER — LEVOTHYROXINE SODIUM 25 UG/1
25 TABLET ORAL DAILY
Qty: 60 TABLET | Refills: 2 | Status: SHIPPED | OUTPATIENT
Start: 2024-09-24

## 2024-10-08 DIAGNOSIS — I48.91 ATRIAL FIBRILLATION, UNSPECIFIED TYPE (MULTI): ICD-10-CM

## 2024-10-08 DIAGNOSIS — I10 ESSENTIAL HYPERTENSION: ICD-10-CM

## 2024-10-08 RX ORDER — PROPRANOLOL HYDROCHLORIDE 60 MG/1
60 CAPSULE, EXTENDED RELEASE ORAL DAILY
Qty: 90 CAPSULE | Refills: 3 | Status: SHIPPED | OUTPATIENT
Start: 2024-10-08

## 2024-10-24 ENCOUNTER — OFFICE VISIT (OUTPATIENT)
Dept: FAMILY MEDICINE CLINIC | Age: 84
End: 2024-10-24

## 2024-10-24 VITALS
SYSTOLIC BLOOD PRESSURE: 130 MMHG | DIASTOLIC BLOOD PRESSURE: 80 MMHG | WEIGHT: 181 LBS | OXYGEN SATURATION: 97 % | BODY MASS INDEX: 32.07 KG/M2 | HEART RATE: 72 BPM | HEIGHT: 63 IN

## 2024-10-24 DIAGNOSIS — I48.91 ATRIAL FIBRILLATION, UNSPECIFIED TYPE (HCC): ICD-10-CM

## 2024-10-24 DIAGNOSIS — Z00.00 MEDICARE ANNUAL WELLNESS VISIT, SUBSEQUENT: Primary | ICD-10-CM

## 2024-10-24 DIAGNOSIS — I73.9 PERIPHERAL VASCULAR DISEASE (HCC): ICD-10-CM

## 2024-10-24 RX ORDER — LOSARTAN POTASSIUM 100 MG/1
100 TABLET ORAL DAILY
COMMUNITY
Start: 2024-10-01

## 2024-10-24 ASSESSMENT — LIFESTYLE VARIABLES
HOW OFTEN DO YOU HAVE A DRINK CONTAINING ALCOHOL: MONTHLY OR LESS
HOW MANY STANDARD DRINKS CONTAINING ALCOHOL DO YOU HAVE ON A TYPICAL DAY: 1 OR 2

## 2024-10-24 ASSESSMENT — PATIENT HEALTH QUESTIONNAIRE - PHQ9
8. MOVING OR SPEAKING SO SLOWLY THAT OTHER PEOPLE COULD HAVE NOTICED. OR THE OPPOSITE, BEING SO FIGETY OR RESTLESS THAT YOU HAVE BEEN MOVING AROUND A LOT MORE THAN USUAL: NOT AT ALL
6. FEELING BAD ABOUT YOURSELF - OR THAT YOU ARE A FAILURE OR HAVE LET YOURSELF OR YOUR FAMILY DOWN: NOT AT ALL
4. FEELING TIRED OR HAVING LITTLE ENERGY: NOT AT ALL
SUM OF ALL RESPONSES TO PHQ QUESTIONS 1-9: 1
SUM OF ALL RESPONSES TO PHQ QUESTIONS 1-9: 1
1. LITTLE INTEREST OR PLEASURE IN DOING THINGS: NOT AT ALL
9. THOUGHTS THAT YOU WOULD BE BETTER OFF DEAD, OR OF HURTING YOURSELF: NOT AT ALL
3. TROUBLE FALLING OR STAYING ASLEEP: NOT AT ALL
SUM OF ALL RESPONSES TO PHQ9 QUESTIONS 1 & 2: 1
SUM OF ALL RESPONSES TO PHQ QUESTIONS 1-9: 1
10. IF YOU CHECKED OFF ANY PROBLEMS, HOW DIFFICULT HAVE THESE PROBLEMS MADE IT FOR YOU TO DO YOUR WORK, TAKE CARE OF THINGS AT HOME, OR GET ALONG WITH OTHER PEOPLE: NOT DIFFICULT AT ALL
7. TROUBLE CONCENTRATING ON THINGS, SUCH AS READING THE NEWSPAPER OR WATCHING TELEVISION: NOT AT ALL
2. FEELING DOWN, DEPRESSED OR HOPELESS: SEVERAL DAYS
5. POOR APPETITE OR OVEREATING: NOT AT ALL
SUM OF ALL RESPONSES TO PHQ QUESTIONS 1-9: 1

## 2024-10-24 NOTE — PROGRESS NOTES
Medicare Annual Wellness Visit    Catalina Lagunas is here for Medicare AWV (Hair is thinning. )    Assessment & Plan   Medicare annual wellness visit, subsequent  -     [facility] Referral to ACP Clinical Specialist  Atrial fibrillation, unspecified type (HCC)  Peripheral vascular disease (HCC)    Recommendations for Preventive Services Due: see orders and patient instructions/AVS.  Recommended screening schedule for the next 5-10 years is provided to the patient in written form: see Patient Instructions/AVS.     Return in about 1 year (around 10/24/2025) for MAW exam due.     Subjective   The following acute and/or chronic problems were also addressed today:  Knee pain, will be seeing ortho soon    Constipation stool softener every few days keeps things normal     Vesicare helps with bladder symptoms at night.  Getting up only once nightly now    Atrial fibrillation is stable being seen by cardiology.  Vascular condition being followed as well    Patient's complete Health Risk Assessment and screening values have been reviewed and are found in Flowsheets. The following problems were reviewed today and where indicated follow up appointments were made and/or referrals ordered.    Positive Risk Factor Screenings with Interventions:      Advance Care Planning     Advance Care Planning (ACP) Physician/NP/PA Conversation    Date of Conversation: 10/24/2024  Conducted with: Patient with Decision Making Capacity  Other persons present: None    Healthcare Decision Maker:   Primary Decision Maker: BeboDea - Child - [phone]    Secondary Decision Maker: Srinivasa Hartmann - Child - [phone]     Today we referred to ACP Clinical Specialist for assistance.    Care Preferences:    Hospitalization:  \"If your health worsens and it becomes clear that your chance of recovery is unlikely, what would be your preference regarding hospitalization?\"  The patient would prefer hospitalization.    Ventilation:  \"If you were unable

## 2024-10-24 NOTE — PATIENT INSTRUCTIONS
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    Discussed exercises for improving speed of memory recall.         Preventing Falls: Care Instructions  Injuries and health problems such as trouble walking or poor eyesight can increase your risk of falling. So can some medicines. But there are things you can do to help prevent falls. You can exercise to get stronger. You can also arrange your home to make it safer.    Talk to your doctor about the medicines you take. Ask if any of them increase the risk of falls and whether they can be changed or stopped.   Try to exercise regularly. It can help improve your strength and balance. This can help lower your risk of falling.         Practice fall safety and prevention.   Wear low-heeled shoes that fit well and give your feet good support. Talk to your doctor if you have foot problems that make this hard.  Carry a cellphone or wear a medical alert device that you can use to call for help.  Use stepladders instead of chairs to reach high objects. Don't climb if you're at risk for falls. Ask for help, if needed.  Wear the correct eyeglasses, if you need them.        Make your home safer.   Remove rugs, cords, clutter, and furniture from walkways.  Keep your house well lit. Use night-lights in hallways and bathrooms.  Install and use sturdy handrails on stairways.  Wear nonskid footwear, even inside. Don't walk barefoot or in socks without shoes.        Be safe outside.   Use handrails, curb cuts, and ramps whenever possible.  Keep your hands free by using a shoulder bag or backpack.  Try to walk in well-lit areas. Watch out for uneven ground, changes in pavement, and debris.  Be careful in the winter. Walk on the grass or gravel when sidewalks are slippery. Use de-icer on steps and walkways. Add non-slip devices to shoes.    Put grab bars and nonskid mats in your shower or tub and near

## 2024-10-25 ENCOUNTER — CLINICAL DOCUMENTATION (OUTPATIENT)
Dept: SPIRITUAL SERVICES | Age: 84
End: 2024-10-25

## 2024-10-25 NOTE — ACP (ADVANCE CARE PLANNING)
Advance Care Planning   Ambulatory ACP Specialist Patient Outreach    Date:  10/25/2024    ACP Specialist:  Tika Thurman LPN    Outreach call to patient in follow-up to ACP Specialist referral from:Omid Glass MD    [x] PCP  [] Provider   [] Ambulatory Care Management [] Other     For:                  [x] Advance Directive Assistance              [] Complete Portable DNR order              [] Complete POST/POLST/MOST              [] Code Status Discussion             [] Discuss Goals of Care             [] Early ACP Decision-Making              [] Other (Specify)    Date Referral Received:10/24/24    Next Step:   [] ACP scheduled conversation  [] Outreach again in one week               [] Email / Mail ACP Info Sheets  [] Email / Mail Advance Directive   [x] Closing referral.  Routing closure to referring provider/staff and to ACP Specialist .    [x] Closure letter mailed to patient with invitation to contact ACP Specialist if / when ready.   [] Other (Specify here):       [x] At this time, Healthcare Decision Maker Is:     Primary Decision Maker: Dea Lagunas - [phone]    Secondary Decision Maker: Srinivasa Hartmann - [phone]    Supplemental (Other) Decision Maker: Arsenio Lagunas          [] Primary agent named in scanned advance directive.    [x] Legal Next of Kin.     [] Unable to determine legal decision maker at this time.    Outreaches:       [x] 1st -  Date:  10/25/24               Intervention:  [x] Spoke with Patient   [] Left Voice mail [] Email / Mail    [] [facility]  [] Other (Specify) :     Outcomes:Writer contacted patient on home/mobile phone [phone] to discuss ACP. Patient stated that she already has a LW and HCPOA.  She confirmed that her Primary HCDM is Rachel Lagunas, Secondary HCDM is Srinivaas Lagunas, and Supplemental is Arsenio Lagunas. Asked patient to take her documents to Dr. Glass's office so the staff can scan a copy into her

## 2025-01-03 DIAGNOSIS — I10 ESSENTIAL (PRIMARY) HYPERTENSION: ICD-10-CM

## 2025-01-03 RX ORDER — LOSARTAN POTASSIUM 100 MG/1
100 TABLET ORAL DAILY
Qty: 90 TABLET | Refills: 3 | Status: SHIPPED | OUTPATIENT
Start: 2025-01-03

## 2025-01-13 ENCOUNTER — APPOINTMENT (OUTPATIENT)
Dept: PRIMARY CARE | Facility: CLINIC | Age: 85
End: 2025-01-13
Payer: MEDICARE

## 2025-01-17 ENCOUNTER — APPOINTMENT (OUTPATIENT)
Dept: PRIMARY CARE | Facility: CLINIC | Age: 85
End: 2025-01-17
Payer: MEDICARE

## 2025-02-07 DIAGNOSIS — R32 URINARY INCONTINENCE, UNSPECIFIED TYPE: ICD-10-CM

## 2025-02-07 RX ORDER — SOLIFENACIN SUCCINATE 10 MG/1
TABLET, FILM COATED ORAL
Qty: 90 TABLET | Refills: 3 | Status: SHIPPED | OUTPATIENT
Start: 2025-02-07

## 2025-04-23 DIAGNOSIS — F32.0 MAJOR DEPRESSIVE DISORDER, SINGLE EPISODE, MILD (CMS-HCC): ICD-10-CM

## 2025-04-23 DIAGNOSIS — I10 ESSENTIAL (PRIMARY) HYPERTENSION: ICD-10-CM

## 2025-04-23 RX ORDER — SERTRALINE HYDROCHLORIDE 25 MG/1
25 TABLET, FILM COATED ORAL DAILY
Qty: 90 TABLET | Refills: 0 | Status: SHIPPED | OUTPATIENT
Start: 2025-04-23

## 2025-04-23 RX ORDER — HYDROCHLOROTHIAZIDE 12.5 MG/1
12.5 TABLET ORAL DAILY
Qty: 90 TABLET | Refills: 0 | Status: SHIPPED | OUTPATIENT
Start: 2025-04-23

## 2025-05-02 ENCOUNTER — APPOINTMENT (OUTPATIENT)
Dept: PRIMARY CARE | Facility: CLINIC | Age: 85
End: 2025-05-02
Payer: MEDICARE

## 2025-05-02 VITALS
TEMPERATURE: 97.1 F | RESPIRATION RATE: 16 BRPM | BODY MASS INDEX: 34.04 KG/M2 | WEIGHT: 185 LBS | OXYGEN SATURATION: 97 % | SYSTOLIC BLOOD PRESSURE: 122 MMHG | HEIGHT: 62 IN | HEART RATE: 68 BPM | DIASTOLIC BLOOD PRESSURE: 64 MMHG

## 2025-05-02 DIAGNOSIS — Z71.89 EARLY INTERVENTION COUNSELING: ICD-10-CM

## 2025-05-02 DIAGNOSIS — I48.91 ATRIAL FIBRILLATION, UNSPECIFIED TYPE (MULTI): ICD-10-CM

## 2025-05-02 DIAGNOSIS — E78.2 MIXED HYPERLIPIDEMIA: ICD-10-CM

## 2025-05-02 DIAGNOSIS — Z00.00 ENCOUNTER FOR MEDICARE ANNUAL WELLNESS EXAM: ICD-10-CM

## 2025-05-02 DIAGNOSIS — R32 URINARY INCONTINENCE, UNSPECIFIED TYPE: Primary | ICD-10-CM

## 2025-05-02 DIAGNOSIS — F32.0 MILD MAJOR DEPRESSION, SINGLE EPISODE (CMS-HCC): ICD-10-CM

## 2025-05-02 DIAGNOSIS — E03.9 HYPOTHYROIDISM, UNSPECIFIED TYPE: ICD-10-CM

## 2025-05-02 DIAGNOSIS — I10 ESSENTIAL HYPERTENSION: ICD-10-CM

## 2025-05-02 DIAGNOSIS — R73.9 HYPERGLYCEMIA: ICD-10-CM

## 2025-05-02 DIAGNOSIS — Z13.89 MULTIPHASIC SCREENING: ICD-10-CM

## 2025-05-02 PROCEDURE — G0446 INTENS BEHAVE THER CARDIO DX: HCPCS | Performed by: FAMILY MEDICINE

## 2025-05-02 PROCEDURE — 1170F FXNL STATUS ASSESSED: CPT | Performed by: FAMILY MEDICINE

## 2025-05-02 PROCEDURE — 1123F ACP DISCUSS/DSCN MKR DOCD: CPT | Performed by: FAMILY MEDICINE

## 2025-05-02 PROCEDURE — 3074F SYST BP LT 130 MM HG: CPT | Performed by: FAMILY MEDICINE

## 2025-05-02 PROCEDURE — 1157F ADVNC CARE PLAN IN RCRD: CPT | Performed by: FAMILY MEDICINE

## 2025-05-02 PROCEDURE — 1160F RVW MEDS BY RX/DR IN RCRD: CPT | Performed by: FAMILY MEDICINE

## 2025-05-02 PROCEDURE — 3078F DIAST BP <80 MM HG: CPT | Performed by: FAMILY MEDICINE

## 2025-05-02 PROCEDURE — 99397 PER PM REEVAL EST PAT 65+ YR: CPT | Performed by: FAMILY MEDICINE

## 2025-05-02 PROCEDURE — G0444 DEPRESSION SCREEN ANNUAL: HCPCS | Performed by: FAMILY MEDICINE

## 2025-05-02 PROCEDURE — 1159F MED LIST DOCD IN RCRD: CPT | Performed by: FAMILY MEDICINE

## 2025-05-02 PROCEDURE — 1036F TOBACCO NON-USER: CPT | Performed by: FAMILY MEDICINE

## 2025-05-02 PROCEDURE — G0439 PPPS, SUBSEQ VISIT: HCPCS | Performed by: FAMILY MEDICINE

## 2025-05-02 PROCEDURE — 1158F ADVNC CARE PLAN TLK DOCD: CPT | Performed by: FAMILY MEDICINE

## 2025-05-02 ASSESSMENT — PATIENT HEALTH QUESTIONNAIRE - PHQ9
1. LITTLE INTEREST OR PLEASURE IN DOING THINGS: SEVERAL DAYS
2. FEELING DOWN, DEPRESSED OR HOPELESS: SEVERAL DAYS
SUM OF ALL RESPONSES TO PHQ9 QUESTIONS 1 AND 2: 2
10. IF YOU CHECKED OFF ANY PROBLEMS, HOW DIFFICULT HAVE THESE PROBLEMS MADE IT FOR YOU TO DO YOUR WORK, TAKE CARE OF THINGS AT HOME, OR GET ALONG WITH OTHER PEOPLE: SOMEWHAT DIFFICULT

## 2025-05-02 ASSESSMENT — ACTIVITIES OF DAILY LIVING (ADL)
DRESSING: INDEPENDENT
TAKING_MEDICATION: INDEPENDENT
BATHING: INDEPENDENT
DOING_HOUSEWORK: INDEPENDENT
MANAGING_FINANCES: INDEPENDENT
GROCERY_SHOPPING: INDEPENDENT

## 2025-05-02 ASSESSMENT — ENCOUNTER SYMPTOMS
DEPRESSION: 0
OCCASIONAL FEELINGS OF UNSTEADINESS: 0
LOSS OF SENSATION IN FEET: 0

## 2025-05-02 NOTE — PROGRESS NOTES
Subjective   Reason for Visit: Sangeeta Vitale is a 84 y.o. female here for a Medicare Wellness visit.   Covid vax: UTD  Flu: UTD  Pneumo:UTD  RSV: advised  Shingles: UTD  CHECKLIST REVIEWED AND COMPLETE FOR [facility] Medicare Annual Wellness Visit Subsequent, Hypertension, Hyperlipidemia, Hypothyroidism, and Depression  ---------------------------------------------------------------------------------------------  Past Medical, Surgical, and Family History reviewed and updated in chart.    Reviewed all medications by prescribing practitioner or clinical pharmacist (such as prescriptions, OTCs, herbal therapies and supplements) and documented in the medical record.    HPI    Patient Self Assessment of Health Status  Patient Self Assessment: Good    Nutrition and Exercise:    Current Diet: HEALTHY Diet always best, minimizing excess carbs,   weight reduction advised if BMI not WNL, please maintain a NORMAL BMI 18.5-24.9    Adequate Fluid Intake: Yes  Caffeine: -aware to minimize intake, <300mg best to even take in LESS  Exercise Frequency: Regularly advised, weight bearing, strengthening, aerobic    Functional Ability/Level of Safety:  Home safety addressed: no active new concerns,   fall risk addressed  NO new hearing issues or concerns  [place] in most ADLs addressed and areas of assistance if present -noted  Some mild forgetfulness  ANY Cognitive Impairment Observed: No cognitive impairment observed    Home Safety Risk Factors: None  --------------------------------------------------------------------------------------------  Patient Care Team:  Ysabel Evans MD as PCP - General    HPI  Problem List[1]   Surgical History[2]    Ablation for afib    PHQ2(-) No active depressed mood or not in crisis, 5min. spent in discussion.    Review of Systems:    NO Seizures  NO CAD  NO CVA    This patient has   NO history of recent Covid nor flu symptoms,  NO Fever nor chills,  NO Chest pain, shortness of breath nor  "paroxysmal nocturnal dyspnea,  NO Nausea, vomiting, nor diarrhea,  NO Hematochezia nor melena,  NO Dysuria, hematuria, nor new incontinence issues  NO new severe headaches nor neurological complaints,  NO new issues with anxiety nor depression nor new psychiatric complaints,  NO suicidal nor homicidal ideations.  ---------------------------------------------------------------------------------------------   OBJECTIVE:  /64   Pulse 68   Temp 36.2 °C (97.1 °F) (Temporal)   Resp 16   Ht 1.575 m (5' 2\")   Wt 83.9 kg (185 lb)   LMP  (LMP Unknown)   SpO2 97%   BMI 33.84 kg/m²      General:  alert, oriented, no acute distress.  No obvious skin rashes noted.   No gait disturbance noted.    Mood is pleasant, not tearful, no signs of emotional distress.  Not appearing intoxicated or altered.   No voiced delusions,   Normal, appropriate behavior.    HEENT: Normocephalic, atraumatic,   Pupils round, reactive to light  Extraocular motions intact and wnl  Tympanic membranes normal    Neck: no nuchal rigidity  No masses palpable.  No carotid bruits.  No thyromegaly.    Respiratory: Equal breath sounds  No wheezes,    rales,    nor rhonchi  No respiratory distress.    Heart: Regular rate and rhythm, no    murmurs  no rubs/gallops    Abdomen: no masses palpable, no rebound nor guarding, no rebound nor guarding.  ovwt  Extremities: NO cyanosis noted, no clubbing.   No edema noted.  2+dorsalis pedis pulses.    Normal-not antalgic, steady gait.  H of h  Varicose veins    No visits with results within 3 Month(s) from this visit.   Latest known visit with results is:   Lab on 03/07/2024   Component Date Value Ref Range Status    WBC 03/07/2024 5.0  4.4 - 11.3 x10*3/uL Final    nRBC 03/07/2024 0.0  0.0 - 0.0 /100 WBCs Final    RBC 03/07/2024 5.38 (H)  4.00 - 5.20 x10*6/uL Final    Hemoglobin 03/07/2024 15.1  12.0 - 16.0 g/dL Final    Hematocrit 03/07/2024 47.8 (H)  36.0 - 46.0 % Final    MCV 03/07/2024 89  80 - 100 fL Final "    MCH 03/07/2024 28.1  26.0 - 34.0 pg Final    MCHC 03/07/2024 31.6 (L)  32.0 - 36.0 g/dL Final    RDW 03/07/2024 13.4  11.5 - 14.5 % Final    Platelets 03/07/2024 173  150 - 450 x10*3/uL Final    Neutrophils % 03/07/2024 66.7  40.0 - 80.0 % Final    Immature Granulocytes %, Automated 03/07/2024 0.4  0.0 - 0.9 % Final    Immature Granulocyte Count (IG) includes promyelocytes, myelocytes and metamyelocytes but does not include bands. Percent differential counts (%) should be interpreted in the context of the absolute cell counts (cells/UL).    Lymphocytes % 03/07/2024 22.8  13.0 - 44.0 % Final    Monocytes % 03/07/2024 8.3  2.0 - 10.0 % Final    Eosinophils % 03/07/2024 0.8  0.0 - 6.0 % Final    Basophils % 03/07/2024 1.0  0.0 - 2.0 % Final    Neutrophils Absolute 03/07/2024 3.36  1.60 - 5.50 x10*3/uL Final    Percent differential counts (%) should be interpreted in the context of the absolute cell counts (cells/uL).    Immature Granulocytes Absolute, Au* 03/07/2024 0.02  0.00 - 0.50 x10*3/uL Final    Lymphocytes Absolute 03/07/2024 1.15  0.80 - 3.00 x10*3/uL Final    Monocytes Absolute 03/07/2024 0.42  0.05 - 0.80 x10*3/uL Final    Eosinophils Absolute 03/07/2024 0.04  0.00 - 0.40 x10*3/uL Final    Basophils Absolute 03/07/2024 0.05  0.00 - 0.10 x10*3/uL Final    Glucose 03/07/2024 100 (H)  74 - 99 mg/dL Final    Sodium 03/07/2024 141  136 - 145 mmol/L Final    Potassium 03/07/2024 4.0  3.5 - 5.3 mmol/L Final    Chloride 03/07/2024 102  98 - 107 mmol/L Final    Bicarbonate 03/07/2024 30  21 - 32 mmol/L Final    Anion Gap 03/07/2024 13  10 - 20 mmol/L Final    Urea Nitrogen 03/07/2024 13  6 - 23 mg/dL Final    Creatinine 03/07/2024 0.81  0.50 - 1.05 mg/dL Final    eGFR 03/07/2024 72  >60 mL/min/1.73m*2 Final    Calculations of estimated GFR are performed using the 2021 CKD-EPI Study Refit equation without the race variable for the IDMS-Traceable creatinine  methods.  https://jasn.asnjournals.org/content/early/2021/09/22/ASN.7452839115    Calcium 03/07/2024 9.7  8.6 - 10.3 mg/dL Final    Albumin 03/07/2024 4.5  3.4 - 5.0 g/dL Final    Alkaline Phosphatase 03/07/2024 68  33 - 136 U/L Final    Total Protein 03/07/2024 6.7  6.4 - 8.2 g/dL Final    AST 03/07/2024 15  9 - 39 U/L Final    Bilirubin, Total 03/07/2024 0.9  0.0 - 1.2 mg/dL Final    ALT 03/07/2024 11  7 - 45 U/L Final    Patients treated with Sulfasalazine may generate falsely decreased results for ALT.    Hemoglobin A1C 03/07/2024 5.0  see below % Final    Estimated Average Glucose 03/07/2024 97  Not Established mg/dL Final    Cholesterol 03/07/2024 160  0 - 199 mg/dL Final          Age      Desirable   Borderline High   High     0-19 Y     0 - 169       170 - 199     >/= 200    20-24 Y     0 - 189       190 - 224     >/= 225         >24 Y     0 - 199       200 - 239     >/= 240   **All ranges are based on fasting samples. Specific   therapeutic targets will vary based on patient-specific   cardiac risk.    Pediatric guidelines reference:Pediatrics 2011, 128(S5).Adult guidelines reference: NCEP ATPIII Guidelines,JAEL 2001, 258:2486-97    Venipuncture immediately after or during the administration of Metamizole may lead to falsely low results. Testing should be performed immediately prior to Metamizole dosing.    HDL-Cholesterol 03/07/2024 47.6  mg/dL Final      Age       Very Low   Low     Normal    High    0-19 Y    < 35      < 40     40-45     ----  20-24 Y    ----     < 40      >45      ----        >24 Y      ----     < 40     40-60      >60      Cholesterol/HDL Ratio 03/07/2024 3.4   Final      Ref Values  Desirable  < 3.4  High Risk  > 5.0    LDL Calculated 03/07/2024 82  <=99 mg/dL Final                                Near   Borderline      AGE      Desirable  Optimal    High     High     Very High     0-19 Y     0 - 109     ---    110-129   >/= 130     ----    20-24 Y     0 - 119     ---    120-159   >/=  160     ----      >24 Y     0 -  99   100-129  130-159   160-189     >/=190      VLDL 03/07/2024 31  0 - 40 mg/dL Final    Triglycerides 03/07/2024 153 (H)  0 - 149 mg/dL Final       Age         Desirable   Borderline High   High     Very High   0 D-90 D    19 - 174         ----         ----        ----  91 D- 9 Y     0 -  74        75 -  99     >/= 100      ----    10-19 Y     0 -  89        90 - 129     >/= 130      ----    20-24 Y     0 - 114       115 - 149     >/= 150      ----         >24 Y     0 - 149       150 - 199    200- 499    >/= 500    Venipuncture immediately after or during the administration of Metamizole may lead to falsely low results. Testing should be performed immediately prior to Metamizole dosing.    Non HDL Cholesterol 03/07/2024 112  0 - 149 mg/dL Final          Age       Desirable   Borderline High   High     Very High     0-19 Y     0 - 119       120 - 144     >/= 145    >/= 160    20-24 Y     0 - 149       150 - 189     >/= 190      ----         >24 Y    30 mg/dL above LDL Cholesterol goal      Thyroid Stimulating Hormone 03/07/2024 1.95  0.44 - 3.98 mIU/L Final    Thyroxine, Free 03/07/2024 0.83  0.61 - 1.12 ng/dL Final        Assessment/Plan     Problem List Items Addressed This Visit       Atrial fibrillation (Multi)    Essential hypertension    Hypothyroidism    Mild major depression, single episode (CMS-[facility])    Mixed hyperlipidemia     Other Visit Diagnoses         Encounter for Medicare annual wellness exam              Advance Care Planning Note   Discussion Date: 05/02/25   Discussion Participants: patient  16 min spent discussing ACP w pt    The patient wishes to discuss Advance Care Planning today and the following is a brief summary of our discussion.     Patient has capacity to make their own medical decisions: Yes  Health Care Agent/Surrogate Decision Maker documented in chart: Yes    Documents on file and valid:  Advance Directive/Living Will: Yes   Health Care Power of  : Yes   Communication of Medical Status/Prognosis:   yes   Communication of Treatment Goals/Options:   yes  Treatment Decisions/involved with patient today  yes  Time Statement: Total face to face time spent on advance care planning was <30 minutes with <30 minutes spent in counseling, including the explanation.    SEE ME AT NEXT REGULARLY SCHEDULED VISIT-sooner if condition deteriorates or new problems arise.    PATIENT WOULD LIKE TO BE A FULL CODE    NO uncontrolled DEPRESSION noted  Assessed and reviewed for opioid use.  NO EVIDENCE OF SIGNIFICANT DEMENTIA    Signs and symptoms of concern with depression-if in crisis -(no current HI/SI) will let us know and proceed to ER   Signs/symptoms of concern with dementia-and need to contact us if they occur discussed.    ASCVD   too high based on age  addressed and risk reduction conversation took place    All above counseling 15 minutes in conversation/documentation etc    Mood counseling 5min- no uncontrolled depression, good support system, has crisis plan if occurs.  Included BMI counseling and options if BMI>30        QUESTIONS answered    Offered cardiology-agreed  Sees urology  Labs due  Sertraline helps mood  Wants to stop vesicare  Knee djd has seen ortho  Some ear fullness and h of h-offered audiology        Next visit addressed -regular visit as scheduled and follow up sooner if condition deterioration or new problems arise.    This completes Sangeeta Vitale ANNUAL MEDICARE WELLNESS VISIT today.  If no other follow ups discussed: Please follow up in 1 year for NEXT ANNUAL MEDICARE WELLNESS VISIT.  Sangeeta Vitale -be aware that any referrals discussed should be placed today or tests/labs ordered should result in prompt scheduling today.   If not done today-then a phone call for scheduling is expected in a timely manner(within 2 weeks).   If testing is to be done-a result should be available to patient within 2 weeks time unless otherwise  specified.   You, the patient or caregiver, are responsible for making sure what was discussed is actually scheduled and completed.  If suboptimal understanding of results of tests or referral reason-a follow up appointment with me should be made.  If above does NOT occur-you are to connect with us for an explanation.  Advised for bladder-to TP  Advised for varicosities-hose etc  See me 6mo  Labs today    Follow up at next scheduled visit -as planned or directed today.  Sooner if new or unresolved issues of concern.    Sangeeta Vitale We know you have a choice for your health care, THANK YOU for choosing  and [facility].  We APPRECIATE YOU.  Sincerely,   Ysabel Evans MD   (dr. Gallagher)      This documentation is subject to inadvertent typing and other similar clerical/grammatic errors etc.             [1]   Patient Active Problem List  Diagnosis    Atrial fibrillation (Multi)    Coccydynia    Essential hypertension    Hypothyroidism    Leg edema    Mild major depression, single episode (CMS-HCC)    Mixed hyperlipidemia    Other microscopic hematuria    Urinary incontinence    Venous insufficiency    Osteoporosis    History of colonic polyps   [2]   Past Surgical History:  Procedure Laterality Date    ACHILLES TENDON SURGERY  2018    COLONOSCOPY W/ POLYPECTOMY  08/2022    tubular adenoma    HYSTERECTOMY  1990    no CA    OTHER SURGICAL HISTORY  2018    cardiac ablation    WRIST FRACTURE SURGERY  1997

## 2025-05-03 LAB
ALBUMIN SERPL-MCNC: 4.3 G/DL (ref 3.6–5.1)
ALP SERPL-CCNC: 61 U/L (ref 37–153)
ALT SERPL-CCNC: 10 U/L (ref 6–29)
ANION GAP SERPL CALCULATED.4IONS-SCNC: 9 MMOL/L (CALC) (ref 7–17)
AST SERPL-CCNC: 15 U/L (ref 10–35)
BASOPHILS # BLD AUTO: 41 CELLS/UL (ref 0–200)
BASOPHILS NFR BLD AUTO: 0.9 %
BILIRUB SERPL-MCNC: 1.1 MG/DL (ref 0.2–1.2)
BUN SERPL-MCNC: 17 MG/DL (ref 7–25)
CALCIUM SERPL-MCNC: 9.1 MG/DL (ref 8.6–10.4)
CHLORIDE SERPL-SCNC: 103 MMOL/L (ref 98–110)
CHOLEST SERPL-MCNC: 155 MG/DL
CHOLEST/HDLC SERPL: 2.9 (CALC)
CO2 SERPL-SCNC: 29 MMOL/L (ref 20–32)
CREAT SERPL-MCNC: 0.75 MG/DL (ref 0.6–0.95)
EGFRCR SERPLBLD CKD-EPI 2021: 78 ML/MIN/1.73M2
EOSINOPHIL # BLD AUTO: 69 CELLS/UL (ref 15–500)
EOSINOPHIL NFR BLD AUTO: 1.5 %
ERYTHROCYTE [DISTWIDTH] IN BLOOD BY AUTOMATED COUNT: 13.2 % (ref 11–15)
EST. AVERAGE GLUCOSE BLD GHB EST-MCNC: 108 MG/DL
EST. AVERAGE GLUCOSE BLD GHB EST-SCNC: 6 MMOL/L
GLUCOSE SERPL-MCNC: 93 MG/DL (ref 65–99)
HBA1C MFR BLD: 5.4 %
HCT VFR BLD AUTO: 43.8 % (ref 35–45)
HDLC SERPL-MCNC: 54 MG/DL
HGB BLD-MCNC: 14.2 G/DL (ref 11.7–15.5)
LDLC SERPL CALC-MCNC: 80 MG/DL (CALC)
LYMPHOCYTES # BLD AUTO: 1067 CELLS/UL (ref 850–3900)
LYMPHOCYTES NFR BLD AUTO: 23.2 %
MCH RBC QN AUTO: 28.5 PG (ref 27–33)
MCHC RBC AUTO-ENTMCNC: 32.4 G/DL (ref 32–36)
MCV RBC AUTO: 88 FL (ref 80–100)
MONOCYTES # BLD AUTO: 405 CELLS/UL (ref 200–950)
MONOCYTES NFR BLD AUTO: 8.8 %
NEUTROPHILS # BLD AUTO: 3018 CELLS/UL (ref 1500–7800)
NEUTROPHILS NFR BLD AUTO: 65.6 %
NONHDLC SERPL-MCNC: 101 MG/DL (CALC)
PLATELET # BLD AUTO: 156 THOUSAND/UL (ref 140–400)
PMV BLD REES-ECKER: 11 FL (ref 7.5–12.5)
POTASSIUM SERPL-SCNC: 4 MMOL/L (ref 3.5–5.3)
PROT SERPL-MCNC: 6.4 G/DL (ref 6.1–8.1)
RBC # BLD AUTO: 4.98 MILLION/UL (ref 3.8–5.1)
SODIUM SERPL-SCNC: 141 MMOL/L (ref 135–146)
T4 FREE SERPL-MCNC: 1 NG/DL (ref 0.8–1.8)
TRIGL SERPL-MCNC: 111 MG/DL
TSH SERPL-ACNC: 2.08 MIU/L (ref 0.4–4.5)
WBC # BLD AUTO: 4.6 THOUSAND/UL (ref 3.8–10.8)

## 2025-05-21 ENCOUNTER — APPOINTMENT (OUTPATIENT)
Dept: PHYSICAL THERAPY | Facility: HOSPITAL | Age: 85
End: 2025-05-21
Payer: MEDICARE

## 2025-05-28 ENCOUNTER — APPOINTMENT (OUTPATIENT)
Dept: CARDIOLOGY | Facility: CLINIC | Age: 85
End: 2025-05-28
Payer: MEDICARE

## 2025-06-04 ENCOUNTER — APPOINTMENT (OUTPATIENT)
Dept: PHYSICAL THERAPY | Facility: HOSPITAL | Age: 85
End: 2025-06-04
Payer: MEDICARE

## 2025-06-04 ENCOUNTER — TELEPHONE (OUTPATIENT)
Dept: PHYSICAL THERAPY | Facility: HOSPITAL | Age: 85
End: 2025-06-04
Payer: MEDICARE

## 2025-06-04 NOTE — TELEPHONE ENCOUNTER
PC MADE TO PT TO ADV ERROR MADE WHEN I SCHEDULED HER AND SHE WAS ACCIDENTALLY SCHED W/WRONG PROVIDER (JAYDEN-DOES NOT DO PELVIC FLOOR). I RESCHEDULED HER W/JESUS (CORRECT PROVIDER). NEW APPT TIME IS 2:15P. LMOM IN REFERENCE TO ALL

## 2025-06-09 ENCOUNTER — EVALUATION (OUTPATIENT)
Dept: PHYSICAL THERAPY | Facility: HOSPITAL | Age: 85
End: 2025-06-09
Payer: MEDICARE

## 2025-06-09 DIAGNOSIS — R32 URINARY INCONTINENCE, UNSPECIFIED TYPE: ICD-10-CM

## 2025-06-09 DIAGNOSIS — R29.898 WEAKNESS OF BOTH HIPS: Primary | ICD-10-CM

## 2025-06-09 PROCEDURE — 97530 THERAPEUTIC ACTIVITIES: CPT | Mod: GO

## 2025-06-09 PROCEDURE — 97162 PT EVAL MOD COMPLEX 30 MIN: CPT | Mod: GP

## 2025-06-09 ASSESSMENT — ENCOUNTER SYMPTOMS
DEPRESSION: 1
LOSS OF SENSATION IN FEET: 0
OCCASIONAL FEELINGS OF UNSTEADINESS: 0

## 2025-06-09 NOTE — LETTER
Mariana 10, 2025    CHAYO AlmeidaT  [address] [facility], [facility] [address]  [facility] [place] [address]    Patient: Sangeeta Vitale   YOB: 1940   Date of Visit: 6/9/2025       Dear Ysabel Evans MD  [address] [place] [address] [address]  [place] [address]    The attached plan of care is being sent to you because your patient’s medical reimbursement requires that you certify the plan of care. Your signature is required to allow uninterrupted insurance coverage.      You may indicate your approval by signing below and faxing this form back to us at Dept Fax: [phone].    Please call Dept: [phone] with any questions or concerns.    Thank you for this referral,        Dianne Salgado DPT  [address] MED[facility] OFF[facility] BUILDING  [address] [place] [address] [place] [address]    Payer: Payor: [employer] MEDICARE / Plan: [employer] GOLD CHOICE / Product Type: *No Product type* /                                                                         Date:     Dear Dianne Salgado DPT,     Re: Ms. Sangeeta Vitale, MRN:[id number]    I certify that I have reviewed the attached plan of care and it is medically necessary for Ms. Sangeeta Vitale (1940) who is under my care.          ______________________________________                    _________________  Provider name and credentials                                           Date and time                                                                                           Plan of Care 6/10/25   Effective from: 6/10/2025  Effective to: 9/8/2025    Plan ID: [id number]            Participants as of Finalize on 6/10/2025    Name Type Comments Contact Info    Ysabel Evans MD PCP - [employer] PCP  [phone]    Dianne Salgado DPT Physical Therapist  [phone]       Last Plan Note     Author: Dianne Salgaod DPT Status: Incomplete Last edited: 6/9/2025  4:15 PM       Physical Therapy    Physical Therapy  Evaluation and Treatment      Patient Name: Sangeeta Vitale  MRN: [id number]  Today's Date: 6/10/2025    Time Entry:   Time Calculation  Start Time: 1615  Stop Time: 1714  Time Calculation (min): 59 min  PT Evaluation Time Entry  PT Evaluation (Moderate) Time Entry: 31  PT Therapeutic Procedures Time Entry  Therapeutic Activity Time Entry: 28               Insurance:  HU[employer] MEDICARE      BOA        COPAY 0   DED (MET) COVERAGE 100 OOP (MET)                   AUTH IS REQ AFTER EVAL  REF# [id number]  AVAILITY [id number]     Visit: 1    Assessment:  PT Assessment  PT Assessment Results: Decreased strength, Decreased range of motion, Decreased endurance, Impaired balance, Decreased mobility, Pain, Impaired tone  Rehab Prognosis: Good  Strengths: Ability to acquire knowledge, Attitude of self   Patient is a  85 y/o female presents with c/o urinary incontinence, urinary frequency, urinary urgensy and constipation with worsening over the past 4 years. She has a PMH of heart disease, hypothyroidism, depression and osteoporosis. Upon examination patient demonstrates decreased B hip and core strength with poor bladder habits and limited water intake limiting overall functional mobility including ability to maintain urine (going through 3 pads/day) she is also limited with lumbar pain and R knee pain with ambulation and functional activities. Pt to benefit from outpatient PT to address deficits, maximize functional mobility and improve QOL.  The clinical presentation of this patient is evolving and their history and examination findings are consistent with a moderate complexity evaluation with good rehab potential.        Plan:  OP PT Plan  Treatment/Interventions: Education/ Instruction, Biofeedback, Manual therapy, Neuromuscular re-education, Self care/ home management, Taping techniques, Therapeutic activities, Therapeutic exercises  PT Plan: Skilled PT  PT Frequency: 1 time per week  Duration: 10 visits  Onset Date:  21  Certification Period Start Date: 06/10/25  Certification Period End Date: 25  Rehab Potential: Good  Plan of Care Agreement: Patient    Current Problem:   1. Urinary incontinence, unspecified type  Referral to Physical Therapy          Subjective   General:  Medical History Form: Reviewed (scanned into chart)    Subjective:     Current Condition:   Worse  [facility]. Symptoms have worsened since taking the vesicare.   Has been having back pain and L side abdominal pain. Ice helps with pain   Recently fell and hit right knee. Getting gel injections       Prior Level of Function (PLOF)  Patient previously independent with all ADLs  Exercise/Physical Activity: minimal  Work/School: retired    Patients Living Environment: Reviewed and no concern    Primary Language: English      Red Flags: Do you have any of the following? No  Fever/chills, unexplained weight changes, dizziness/fainting, unexplained change in bowel or bladder functions, unexplained malaise or muscle weakness, night pain/sweats, numbness or tingling    PELVIC HISTORY:  Chief Complaint/Description of Symptoms: urinary frequency, urgency, incontinences and constopation   Date of Onset: 4 years ago  Surgeries: open hysterectomy, appendectomy   Current Medications: see chart  # Pregnancies/Complications:  vaginal deliveries, recalls stitches   Home Environment/Social Factors/Occupation: retired    PELVIC PAIN:  Description: none    Since onset, the symptoms are: worse  Pain when emptying bladder: no  Pain with wiping or tight clothing: no  Pain with intercourse: no active   History of back pain: yes    EXERCISE:  Do you do Kegels? No    Current exercise regime: for knees - standing hip abduction, demonstrates hamstring stretch, seated bike     BLADDER:  Excessive Urinary Urgency: yes, every time she goes  Daytime Voiding Frequency: every 2-2.5 hours  Nighttime Voiding Frequency: at least 3 times  "  Unintentional urine loss frequency: yes , tries to hold it   Leakage occurs with: with urgency   Leakage amount: soaks through pad, not to clothes (must change to a new day)  Using ~3 pads per day (heavy duty ones)   Difficulty initiating flow of urine: no, feels like she starts before she even sits down   Slow/weak urine stream: strong   Difficulty starting urine stream/push to urinate: will sit longer and more urine will come out  Able to completely empty bladder: yes after she sits for a bit   Tests performed by doctor: urodynamic test  Frequent UTI's: not lately        BOWEL:  Excessive Bowel Urgency: sometimes   BM Frequency: 1 time daily  Frequent Diarrhea: no  Frequent constipation/straining/incomplete emptying: yes, straining, pain   Unintentional stool loss: no    LIFESTYLE:   Caffeine intake: coffee 3 cups, iced   H2O intake: 3 glasses of water a day   Smoking: no  Alcohol: socially   Special diets? no  Sleep quality: good that she can fall asleep easily, getting woken to go     GOALS FOR PT: \" urine, back pain\"    General  Reason for Referral: urinary incontincence  Referred By: Dr. Ysabel Evans  Past Medical History Relevant to Rehab: heart disesae, R TKA, L KHALIF, OP, UTI, depression, hysterectomy  Precautions:  Precautions  Precautions Comment: fall       Pain:  Pain Assessment  Pain Assessment: 0-10  0-10 (Numeric) Pain Score: 4  Pain Type: Chronic pain  Pain Location: Back  Pain Orientation: Mid  Home Living: lives alone     Prior Level of Function: drives, independent with ADLs       Objective  Posture: forward head, rounded shoulders    Crepitus B knees     Lumbar AROM       Flexion: WFL       Extension: 75% limitation       Sidebending:       R WFL   L WFL       Rotation:             R WFL  L WFL    Squat: unable    SLS: unable     MMT:           Hip Flex:    R 4-/5  L 4-/5             Hip Ext:     R 3+/5  L 3+/5          Hip ABD:  R 3+/5  L 3+/5          Hip ADD:  R 4-/5  L 4-/5       "    Hip IR:      R 3+/5  L 3+/5          Hip ER:     R 3+/5  L 3+/5          Knee Flex:  R 4/5  L 4/5          Knee Ext:     R 4/5  L 4/5          Ankle DF:    R 5/5  L 5/5    Muscle Length Tests            HS 90/90: fair          Outcome Measures:  Female NIH: Pain 1 Urinary 9 QOL 10 Total 20     Treatments:  Bladder diary  Overcoming the urge  Bladder retraining   Education on pelvic floor PT    EDUCATION:  Outpatient Education  Individual(s) Educated: Patient  Education Provided: Anatomy, Home Exercise Program, Physiology, POC, Posture  Risk and Benefits Discussed with Patient/Caregiver/Other: yes  Patient/Caregiver Demonstrated Understanding: yes  Plan of Care Discussed and Agreed Upon: yes  Patient Response to Education: Patient/Caregiver Verbalized Understanding of Information    Goals:  STGs: 4 weeks     Pt will be able to isolate pelvic floor through ability to contract and relax PFMs on cue to improve lumbopelvic stability and continence.    Pt will be compliant with participation in home exercise program 3-5x/week with good body mechanics to facilitate independent rehab program upon discharge.    LTGs: 10 weeks    Pt will show improvement of PFM strength by 1 MMT grade to allow for improved ability to support change in pressures in abdomen and thorax to reduce incontinence.    Pt will show improvement of PFM endurance to 15 seconds to allow for improved ability to support change in pressures in abdomen and thorax to reduce incontinence.     Pt will demonstrate improved TA activation to 15sec for reduced back pain with functional activities.     Pt will demonstrate improved strength in all deficient musculature by 1/2 MMT grade to allow for improved pelvic stability     Pt will report decreased urinary frequency to voiding every 3-4 hours to allow for improved QOL.     Pt will report 75% improvement in UUI s/s through appropriate use of urge suppression techniques to allow for reduced leakage while out  in the community.     Pt will report decreased NIH-CPSI score by 6 points (MCID) to demonstrate improved QOL and ability to perform ADLs. Baseline: 20                 Current Participants as of 6/10/2025    Name Type Comments Contact Info    Ysabel Evans MD PCP - [employer] PCP  [phone]    Signature pending    Dianne Salgado DPT Physical Therapist  [phone]    Electronically signed by Dianne Salgado DPT at 6/10/2025 4910 EDT

## 2025-06-09 NOTE — PROGRESS NOTES
Physical Therapy    Physical Therapy Evaluation and Treatment      Patient Name: Sangeeta Vitale  MRN: [id number]  Today's Date: 6/10/2025    Time Entry:   Time Calculation  Start Time: 1615  Stop Time: 1714  Time Calculation (min): 59 min  PT Evaluation Time Entry  PT Evaluation (Moderate) Time Entry: 31  PT Therapeutic Procedures Time Entry  Therapeutic Activity Time Entry: 28               Insurance:  HUMANA MEDICARE      BOA        COPAY 0   DED (MET) COVERAGE 100 OOP (MET)                   AUTH IS REQ AFTER EVAL  REF# [id number]  AVAILITY [id number]/ALL     Visit: 1    Assessment:  PT Assessment  PT Assessment Results: Decreased strength, Decreased range of motion, Decreased endurance, Impaired balance, Decreased mobility, Pain, Impaired tone  Rehab Prognosis: Good  Strengths: Ability to acquire knowledge, Attitude of self   Patient is a  85 y/o female presents with c/o urinary incontinence, urinary frequency, urinary urgensy and constipation with worsening over the past 4 years. She has a PMH of heart disease, hypothyroidism, depression and osteoporosis. Upon examination patient demonstrates decreased B hip and core strength with poor bladder habits and limited water intake limiting overall functional mobility including ability to maintain urine (going through 3 pads/day) she is also limited with lumbar pain and R knee pain with ambulation and functional activities. Pt to benefit from outpatient PT to address deficits, maximize functional mobility and improve QOL.  The clinical presentation of this patient is evolving and their history and examination findings are consistent with a moderate complexity evaluation with good rehab potential.        Plan:  OP PT Plan  Treatment/Interventions: Education/ Instruction, Biofeedback, Manual therapy, Neuromuscular re-education, Self care/ home management, Taping techniques, Therapeutic activities, Therapeutic exercises  PT Plan: Skilled PT  PT Frequency: 1 time per  week  Duration: 10 visits  Onset Date: 21  Certification Period Start Date: 06/10/25  Certification Period End Date: 25  Rehab Potential: Good  Plan of Care Agreement: Patient    Current Problem:   1. Weakness of both hips        2. Urinary incontinence, unspecified type  Referral to Physical Therapy          Subjective    General:  Medical History Form: Reviewed (scanned into chart)    Subjective:     Current Condition:   Worse  [facility] Tru[facility]. Symptoms have worsened since taking the vesicare.   Has been having back pain and L side abdominal pain. Ice helps with pain   Recently fell and hit right knee. Getting gel injections       Prior Level of Function (PLOF)  Patient previously independent with all ADLs  Exercise/Physical Activity: minimal  Work/School: retired    Patients Living Environment: Reviewed and no concern    Primary Language: English      Red Flags: Do you have any of the following? No  Fever/chills, unexplained weight changes, dizziness/fainting, unexplained change in bowel or bladder functions, unexplained malaise or muscle weakness, night pain/sweats, numbness or tingling    PELVIC HISTORY:  Chief Complaint/Description of Symptoms: urinary frequency, urgency, incontinences and constopation   Date of Onset: 4 years ago  Surgeries: open hysterectomy, appendectomy   Current Medications: see chart  # Pregnancies/Complications:  vaginal deliveries, recalls stitches   Home Environment/Social Factors/Occupation: retired    PELVIC PAIN:  Description: none    Since onset, the symptoms are: worse  Pain when emptying bladder: no  Pain with wiping or tight clothing: no  Pain with intercourse: no active   History of back pain: yes    EXERCISE:  Do you do Kegels? No    Current exercise regime: for knees - standing hip abduction, demonstrates hamstring stretch, seated bike     BLADDER:  Excessive Urinary Urgency: yes, every time she goes  Daytime Voiding  "Frequency: every 2-2.5 hours  Nighttime Voiding Frequency: at least 3 times   Unintentional urine loss frequency: yes , tries to hold it   Leakage occurs with: with urgency   Leakage amount: soaks through pad, not to clothes (must change to a new day)  Using ~3 pads per day (heavy duty ones)   Difficulty initiating flow of urine: no, feels like she starts before she even sits down   Slow/weak urine stream: strong   Difficulty starting urine stream/push to urinate: will sit longer and more urine will come out  Able to completely empty bladder: yes after she sits for a bit   Tests performed by doctor: urodynamic test  Frequent UTI's: not lately        BOWEL:  Excessive Bowel Urgency: sometimes   BM Frequency: 1 time daily  Frequent Diarrhea: no  Frequent constipation/straining/incomplete emptying: yes, straining, pain   Unintentional stool loss: no    LIFESTYLE:   Caffeine intake: coffee 3 cups, iced   H2O intake: 3 glasses of water a day   Smoking: no  Alcohol: socially   Special diets? no  Sleep quality: good that she can fall asleep easily, getting woken to go     GOALS FOR PT: \" urine, back pain\"    General  Reason for Referral: urinary incontincence  Referred By: Dr. Ysabel Evans  Past Medical History Relevant to Rehab: heart disesae, R TKA, L KHALIF, OP, UTI, depression, hysterectomy  Precautions:  Precautions  Precautions Comment: fall       Pain:  Pain Assessment  Pain Assessment: 0-10  0-10 (Numeric) Pain Score: 4  Pain Type: Chronic pain  Pain Location: Back  Pain Orientation: Mid  Home Living: lives alone     Prior Level of Function: drives, independent with ADLs       Objective   Posture: forward head, rounded shoulders    Crepitus B knees     Lumbar AROM       Flexion: WFL       Extension: 75% limitation       Sidebending:       R WFL   L WFL       Rotation:             R WFL  L WFL    Squat: unable    SLS: unable     MMT:           Hip Flex:    R 4-/5  L 4-/5             Hip Ext:     R 3+/5  L " 3+/5          Hip ABD:  R 3+/5  L 3+/5          Hip ADD:  R 4-/5  L 4-/5          Hip IR:      R 3+/5  L 3+/5          Hip ER:     R 3+/5  L 3+/5          Knee Flex:  R 4/5  L 4/5          Knee Ext:     R 4/5  L 4/5          Ankle DF:    R 5/5  L 5/5    Muscle Length Tests            HS 90/90: fair          Outcome Measures:  Female NIH: Pain 1 Urinary 9 QOL 10 Total 20     Treatments:  Bladder diary  Overcoming the urge  Bladder retraining   Education on pelvic floor PT    EDUCATION:  Outpatient Education  Individual(s) Educated: Patient  Education Provided: Anatomy, Home Exercise Program, Physiology, POC, Posture  Risk and Benefits Discussed with Patient/Caregiver/Other: yes  Patient/Caregiver Demonstrated Understanding: yes  Plan of Care Discussed and Agreed Upon: yes  Patient Response to Education: Patient/Caregiver Verbalized Understanding of Information    Goals:  STGs: 4 weeks     Pt will be able to isolate pelvic floor through ability to contract and relax PFMs on cue to improve lumbopelvic stability and continence.    Pt will be compliant with participation in home exercise program 3-5x/week with good body mechanics to facilitate independent rehab program upon discharge.    LTGs: 10 weeks    Pt will show improvement of PFM strength by 1 MMT grade to allow for improved ability to support change in pressures in abdomen and thorax to reduce incontinence.    Pt will show improvement of PFM endurance to 15 seconds to allow for improved ability to support change in pressures in abdomen and thorax to reduce incontinence.     Pt will demonstrate improved TA activation to 15sec for reduced back pain with functional activities.     Pt will demonstrate improved strength in all deficient musculature by 1/2 MMT grade to allow for improved pelvic stability     Pt will report decreased urinary frequency to voiding every 3-4 hours to allow for improved QOL.     Pt will report 75% improvement in UUI s/s through  appropriate use of urge suppression techniques to allow for reduced leakage while out in the community.     Pt will report decreased NIH-CPSI score by 6 points (MCID) to demonstrate improved QOL and ability to perform ADLs. Baseline: 20           DISCHARGE

## 2025-06-10 PROBLEM — R29.898 WEAKNESS OF BOTH HIPS: Status: ACTIVE | Noted: 2025-06-10

## 2025-06-10 ASSESSMENT — PAIN SCALES - GENERAL: PAINLEVEL_OUTOF10: 4

## 2025-06-10 ASSESSMENT — PAIN - FUNCTIONAL ASSESSMENT: PAIN_FUNCTIONAL_ASSESSMENT: 0-10

## 2025-06-17 ENCOUNTER — TREATMENT (OUTPATIENT)
Dept: PHYSICAL THERAPY | Facility: HOSPITAL | Age: 85
End: 2025-06-17
Payer: MEDICARE

## 2025-06-17 DIAGNOSIS — R32 URINARY INCONTINENCE, UNSPECIFIED TYPE: ICD-10-CM

## 2025-06-17 DIAGNOSIS — R29.898 WEAKNESS OF BOTH HIPS: Primary | ICD-10-CM

## 2025-06-17 PROCEDURE — 97110 THERAPEUTIC EXERCISES: CPT | Mod: GP

## 2025-06-17 ASSESSMENT — PAIN SCALES - GENERAL: PAINLEVEL_OUTOF10: 0 - NO PAIN

## 2025-06-17 ASSESSMENT — PAIN - FUNCTIONAL ASSESSMENT: PAIN_FUNCTIONAL_ASSESSMENT: 0-10

## 2025-06-17 NOTE — PROGRESS NOTES
Physical Therapy    Physical Therapy Treatment    Patient Name: Sangeeta Vitale  MRN: [id number]  Today's Date: 6/17/2025    Time Entry:   Time Calculation  Start Time: 1415  Stop Time: 1459  Time Calculation (min): 44 min     PT Therapeutic Procedures Time Entry  Therapeutic Exercise Time Entry: 44                 Insurance:  HUMANA MEDICARE      BOA        COPAY 0   APPROVED  10  PT  VISITS  6-9-25 THRU 7-25-25  [facility]# [id number]      [id number]/ALL    Visit: 2/10    Assessment:   Introduced several exercises this date to improve core and B hip strength. Unable to perform supine march d/t increased R knee pain, but able to tolerate march in standing. Frequent cues needed for exercises. Reviewed bladder irritations and monitoring for changes in frequency and urgency. Encouraged to continue to increase water intake.  Plan:   Continue to improve hip and core strength    Current Problem  1. Weakness of both hips  Follow Up In Physical Therapy      2. Urinary incontinence, unspecified type  Follow Up In Physical Therapy          General   Brought in bladder diary  Last night was the first time in a long time that she got up to go to the bathroom 1 time  Consistently 3 times for training of pads.      Going for B knee injections Friday         Subjective    Precautions  Precautions  Precautions Comment: fall       Pain  Pain Assessment  Pain Assessment: 0-10  0-10 (Numeric) Pain Score: 0 - No pain    Objective     Treatments:  TA isometric with elbow press: 15x5 seconds   BKFO with TA isometric elbow press: RTB x20  Hooklying hip abduction with ball press with TA isometric elbow press: x20  Supine march: x10 painful  Standing march with counter press: x20    Access Code: [email or web address]  URL: [email or web address]  Date: 06/17/2025  Prepared by: Dianne Salgado    Exercises  - Supine Transversus Abdominis Bracing - Hands on Ground  - 1 x daily - 7 x weekly - 2 sets - 10 reps - 5 seconds hold  - Hooklying  Single Leg Bent Knee Fallouts with Resistance  - 1 x daily - 7 x weekly - 3 sets - 10 reps  - Supine Hip Adduction Isometric with Ball  - 1 x daily - 7 x weekly - 3 sets - 10 reps  - Standing March with Counter Support  - 1 x daily - 7 x weekly - 3 sets - 10 reps    OP EDUCATION:

## 2025-06-18 ENCOUNTER — APPOINTMENT (OUTPATIENT)
Dept: CARDIOLOGY | Facility: CLINIC | Age: 85
End: 2025-06-18
Payer: MEDICARE

## 2025-06-20 ENCOUNTER — TELEPHONE (OUTPATIENT)
Dept: PHYSICAL THERAPY | Facility: HOSPITAL | Age: 85
End: 2025-06-20
Payer: MEDICARE

## 2025-06-20 DIAGNOSIS — R29.898 WEAKNESS OF BOTH HIPS: Primary | ICD-10-CM

## 2025-06-20 NOTE — TELEPHONE ENCOUNTER
PC MADE TO PT TO ADV PROVIDERS SCHED TIGHTENED FOR 07/01/25 AND NEW APPT TIME SET FOR 3PM; SPOKE TO PT DIRECT AND APPT CONFIRMED

## 2025-06-23 ENCOUNTER — HOSPITAL ENCOUNTER (OUTPATIENT)
Dept: RADIOLOGY | Facility: HOSPITAL | Age: 85
Discharge: HOME | End: 2025-06-23
Payer: MEDICARE

## 2025-06-23 ENCOUNTER — APPOINTMENT (OUTPATIENT)
Dept: PRIMARY CARE | Facility: CLINIC | Age: 85
End: 2025-06-23
Payer: MEDICARE

## 2025-06-23 VITALS
DIASTOLIC BLOOD PRESSURE: 60 MMHG | TEMPERATURE: 97.2 F | HEART RATE: 68 BPM | HEIGHT: 62 IN | SYSTOLIC BLOOD PRESSURE: 114 MMHG | WEIGHT: 185 LBS | OXYGEN SATURATION: 97 % | RESPIRATION RATE: 16 BRPM | BODY MASS INDEX: 34.04 KG/M2

## 2025-06-23 DIAGNOSIS — I48.91 ATRIAL FIBRILLATION, UNSPECIFIED TYPE (MULTI): ICD-10-CM

## 2025-06-23 DIAGNOSIS — E78.2 MIXED HYPERLIPIDEMIA: ICD-10-CM

## 2025-06-23 DIAGNOSIS — M25.552 LEFT HIP PAIN: Primary | ICD-10-CM

## 2025-06-23 DIAGNOSIS — F32.0 MILD MAJOR DEPRESSION, SINGLE EPISODE: ICD-10-CM

## 2025-06-23 DIAGNOSIS — I87.2 VENOUS INSUFFICIENCY: ICD-10-CM

## 2025-06-23 DIAGNOSIS — M25.552 LEFT HIP PAIN: ICD-10-CM

## 2025-06-23 DIAGNOSIS — B37.2 CANDIDAL INTERTRIGO: ICD-10-CM

## 2025-06-23 DIAGNOSIS — E03.9 HYPOTHYROIDISM, UNSPECIFIED TYPE: ICD-10-CM

## 2025-06-23 DIAGNOSIS — I10 ESSENTIAL HYPERTENSION: ICD-10-CM

## 2025-06-23 PROCEDURE — 72110 X-RAY EXAM L-2 SPINE 4/>VWS: CPT

## 2025-06-23 PROCEDURE — 1159F MED LIST DOCD IN RCRD: CPT | Performed by: FAMILY MEDICINE

## 2025-06-23 PROCEDURE — 73502 X-RAY EXAM HIP UNI 2-3 VIEWS: CPT | Mod: LT

## 2025-06-23 PROCEDURE — 3074F SYST BP LT 130 MM HG: CPT | Performed by: FAMILY MEDICINE

## 2025-06-23 PROCEDURE — 72110 X-RAY EXAM L-2 SPINE 4/>VWS: CPT | Performed by: RADIOLOGY

## 2025-06-23 PROCEDURE — 1036F TOBACCO NON-USER: CPT | Performed by: FAMILY MEDICINE

## 2025-06-23 PROCEDURE — 73502 X-RAY EXAM HIP UNI 2-3 VIEWS: CPT | Mod: LEFT SIDE | Performed by: RADIOLOGY

## 2025-06-23 PROCEDURE — 99213 OFFICE O/P EST LOW 20 MIN: CPT | Performed by: FAMILY MEDICINE

## 2025-06-23 PROCEDURE — 3078F DIAST BP <80 MM HG: CPT | Performed by: FAMILY MEDICINE

## 2025-06-23 RX ORDER — KETOCONAZOLE 20 MG/G
CREAM TOPICAL 2 TIMES DAILY
Qty: 60 G | Refills: 3 | Status: SHIPPED | OUTPATIENT
Start: 2025-06-23

## 2025-06-23 NOTE — PROGRESS NOTES
Sangeeta Vitale 84 y.o. female presents today with  Chief Complaint   Patient presents with    Hip Pain     Left--has been doing pelvic floor therapy and she thinks that is what is causing the hip pain      Covid vax: UTD  Flu: UTD  Pneumo: UTD  RSV: advised  Shingles: UTD   has exercises   No major urinary sxs  Pain is 6/10  Sits and ic or heat helps  Takes nsaid occ aware of rba    Problem List[1]   Surgical History[2]   Allergies[3]   Current Medications[4]   Social History     Tobacco Use    Smoking status: Never    Smokeless tobacco: Never   Substance Use Topics    Alcohol use: Yes     Comment: rare        Patient has   NO Fever/chills  No Nausea/vomiting/diarrhea  No CVA tenderness  No Confusion   No Paresthesias nor headache    NO dysuria and frequency   With out     gross hematuria       Objective:    General:  alert, oriented, no acute distress.  No obvious skin rashes noted.   No gait disturbance noted.    Mood is pleasant, not tearful, no signs of emotional distress.  Not appearing intoxicated or altered.   No voiced delusions,   Normal, appropriate behavior.    HEENT: Normocephalic, atraumatic,   Pupils round, reactive to light  Extraocular motions intact and wnl  Tympanic membranes normal    Neck: no nuchal rigidity  No masses palpable.  No carotid bruits.  No thyromegaly.    Respiratory: Equal breath sounds  No wheezes,    rales,    nor rhonchi  No respiratory distress.    Heart: Regular rate and rhythm, no    murmurs  no rubs/gallops    Abdomen: no masses palpable, no rebound nor guarding, no rebound nor guarding.  ovwt  Extremities: NO cyanosis noted, no clubbing.   No edema noted.  2+dorsalis pedis pulses.    Normal-not antalgic, steady gait.  Pain over si on L and full LE rom no pain right now    Lab Results   Component Value Date    COLORU YELLOW 09/06/2023    APPEARANCEU CLEAR 09/06/2023    CARLOS 6.0 09/06/2023    LEUKOCYTESU TRACE (A) 09/06/2023    NITRITEU NEGATIVE 09/06/2023    PROTUR  NEGATIVE 09/06/2023    GLUCOSEU NEGATIVE 09/06/2023    BILIRUBINU NEGATIVE 09/06/2023    SPECGRAVU 1.014 09/06/2023    KETONESU NEGATIVE 09/06/2023    UROBILINOGEN <2.0 09/06/2023    BLOODU SMALL (1+) (A) 09/06/2023       1. Atrial fibrillation, unspecified type (Multi)        2. Essential hypertension        3. Mixed hyperlipidemia        4. Venous insufficiency        5. Hypothyroidism, unspecified type        6. Mild major depression, single episode        7. Candidal intertrigo  ketoconazole (NIZOral) 2 % cream           The risks benefits and alternatives for the antibiotic prescribed were discussed with patient as well as possible side effects. If ANY signs or symptoms of allergic reaction patient is to discontinue medicine immediately and call us or GO TO ER if tongue swelling/respiratory issues or significant effects.  Antibiotics are usually meant to be taken to completion as advised and it is usual course for symptoms to IMPROVE while taking-if symptoms worsen or new problems arise, we should be notified or medical evaluation should occur.  If urine is positive for blood patient is aware a REPEAT urine must be done and if persistent-evaluation by Urology is advised.    If urine culture shows bacteria NOT susceptible to antibiotic, antibiotic will likely be changed if symptoms persists which is why if symptoms persist on antibiotic, please call or message the office.    Sangeeta Vitale -be aware that any referrals discussed should be placed today or tests/labs ordered should result in prompt scheduling today.   If not done today-then a phone call for scheduling is expected in a timely manner(within 2 weeks).   If testing is to be done-a result should be available to patient within 2 weeks time unless otherwise specified.   You, the patient or caregiver, are responsible for making sure what was discussed is actually scheduled and completed.  If suboptimal understanding of results of tests or referral  reason-a follow up appointment with me should be made.  If above does NOT occur-you are to connect with us for an explanation.  Offered ortho  Declines bc of feared risks    Follow up at next scheduled visit -as planned or directed today.  Sooner if new or unresolved issues of concern.    Sangeeta Vitale We know you have a choice for your health care, THANK YOU for choosing us and [facility].  We APPRECIATE YOU.  Sincerely,   Ysabel Evans MD   (dr. Gallagher)  Ysabel Evans MD         [1]   Patient Active Problem List  Diagnosis    Atrial fibrillation (Multi)    Coccydynia    Essential hypertension    Hypothyroidism    Leg edema    Mild major depression, single episode    Mixed hyperlipidemia    Other microscopic hematuria    Urinary incontinence    Venous insufficiency    Osteoporosis    History of colonic polyps    Weakness of both hips   [2]   Past Surgical History:  Procedure Laterality Date    ACHILLES TENDON SURGERY  2018    COLONOSCOPY W/ POLYPECTOMY  08/2022    tubular adenoma    HYSTERECTOMY  1990    no CA    OTHER SURGICAL HISTORY  2018    cardiac ablation    WRIST FRACTURE SURGERY  1997   [3]   Allergies  Allergen Reactions    Sulfa (Sulfonamide Antibiotics) Swelling    Cortisone Itching    Trimethoprim Swelling     Facial swelling, itching, generalized redness    Aspirin Other    Meperidine Other and Nausea/vomiting    Nitrofurantoin Monohyd/M-Cryst Rash   [4]   Current Outpatient Medications   Medication Sig Dispense Refill    fluticasone (Flonase) 50 mcg/actuation nasal spray 1 spray.      HAIR, SKIN AND NAILS, BIOTIN, ORAL Take by mouth.      hydroCHLOROthiazide (Microzide) 12.5 mg tablet TAKE 1 TABLET BY MOUTH ONCE DAILY 90 tablet 0    levothyroxine (Synthroid, Levoxyl) 25 mcg tablet TAKE 1 TABLET BY MOUTH DAILY 60 tablet 2    losartan (Cozaar) 100 mg tablet Take 1 tablet (100 mg) by mouth once daily. 90 tablet 3    omeprazole OTC (PriLOSEC OTC) 20 mg EC tablet Take 1 tablet (20  mg) by mouth once daily in the morning. Take before meals. Do not crush, chew, or split.      propranolol (Inderal) 10 mg tablet TAKE 1 TABLET BY MOUTH AS NEEDED for palpitations. max 2/day      propranolol LA (Inderal LA) 60 mg 24 hr capsule TAKE 1 CAPSULE BY MOUTH ONCE DAILY 90 capsule 3    sertraline (Zoloft) 25 mg tablet TAKE 1 TABLET BY MOUTH EVERY DAY 90 tablet 0    simvastatin (Zocor) 40 mg tablet Take 1 tablet (40 mg) by mouth once daily at bedtime. 90 tablet 3    solifenacin (VESIcare) 10 mg tablet TAKE 1 TABLET BY MOUTH ONCE DAILY, swallow TABLET whole; do not CHEW, crush or split 90 tablet 3    ketoconazole (NIZOral) 2 % cream Apply topically 2 times a day. 60 g 3     No current facility-administered medications for this visit.

## 2025-06-24 ENCOUNTER — TREATMENT (OUTPATIENT)
Dept: PHYSICAL THERAPY | Facility: HOSPITAL | Age: 85
End: 2025-06-24
Payer: MEDICARE

## 2025-06-24 DIAGNOSIS — R32 URINARY INCONTINENCE, UNSPECIFIED TYPE: ICD-10-CM

## 2025-06-24 DIAGNOSIS — R29.898 WEAKNESS OF BOTH HIPS: Primary | ICD-10-CM

## 2025-06-25 ENCOUNTER — TELEPHONE (OUTPATIENT)
Dept: PRIMARY CARE | Facility: CLINIC | Age: 85
End: 2025-06-25
Payer: MEDICARE

## 2025-06-27 DIAGNOSIS — E78.2 MIXED HYPERLIPIDEMIA: ICD-10-CM

## 2025-06-30 RX ORDER — SIMVASTATIN 40 MG/1
40 TABLET, FILM COATED ORAL NIGHTLY
Qty: 90 TABLET | Refills: 3 | Status: SHIPPED | OUTPATIENT
Start: 2025-06-30

## 2025-07-01 ENCOUNTER — TREATMENT (OUTPATIENT)
Dept: PHYSICAL THERAPY | Facility: HOSPITAL | Age: 85
End: 2025-07-01
Payer: MEDICARE

## 2025-07-01 DIAGNOSIS — R29.898 WEAKNESS OF BOTH HIPS: Primary | ICD-10-CM

## 2025-07-01 DIAGNOSIS — R32 URINARY INCONTINENCE, UNSPECIFIED TYPE: ICD-10-CM

## 2025-07-01 PROCEDURE — 97110 THERAPEUTIC EXERCISES: CPT | Mod: GP

## 2025-07-01 ASSESSMENT — PAIN SCALES - GENERAL: PAINLEVEL_OUTOF10: 0 - NO PAIN

## 2025-07-01 ASSESSMENT — PAIN - FUNCTIONAL ASSESSMENT: PAIN_FUNCTIONAL_ASSESSMENT: 0-10

## 2025-07-01 NOTE — PROGRESS NOTES
Physical Therapy    Physical Therapy Treatment    Patient Name: Sangeeta Vitale  MRN: [id number]  Today's Date: 7/1/2025    Time Entry:   Time Calculation  Start Time: 1318  Stop Time: 1358  Time Calculation (min): 40 min     PT Therapeutic Procedures Time Entry  Therapeutic Exercise Time Entry: 40                 Insurance:  HUMANA MEDICARE      BOA        COPAY 0   APPROVED  10  PT  VISITS  6-9-25 THRU 7-25-25  AUTH# [id number]      [id number]     Visit: 3/10    Assessment:   Patient arrived painfree and notes pain is back to her baseline. Has been working on HEP. Would like to proceed with PT. Able to complete supine bridges today that were pain free when only performing a small lift. Introduced bridges which was effortful for her, but she was able to complete painfree. Cues for neutral pelvis with ability to achieve.   Educated on delayed onset muscle soreness and to expect soreness especially with the addition of new exercises. Patient with understanding.  Plan:   Continue to improve strength and B Les and core     Current Problem  1. Weakness of both hips  Follow Up In Physical Therapy      2. Urinary incontinence, unspecified type  Follow Up In Physical Therapy          General   Slept through the nights a few nights when she stopped liquids at 6pm. Getting up about 1 time a night.  Lumbar pain is intermittent and uses heating pad       Subjective    Precautions  Precautions  Precautions Comment: fall       Pain  Pain Assessment  Pain Assessment: 0-10  0-10 (Numeric) Pain Score: 0 - No pain    Objective     Treatments:  TA isometric with elbow press: 15x5 seconds   BKFO with TA isometric elbow press: RTB 2 minutes   Hooklying hip abduction with ball press with TA isometric elbow press: 2 minutes   Supine march: RTB x20  Bridge 3 way: x10 each     Standing march with counter press: ---     Access Code: [email or web address]  URL: [email or web address]  Date: 06/17/2025  Prepared by: Dianne  Damian

## 2025-07-08 ENCOUNTER — APPOINTMENT (OUTPATIENT)
Dept: PHYSICAL THERAPY | Facility: HOSPITAL | Age: 85
End: 2025-07-08
Payer: MEDICARE

## 2025-07-09 ENCOUNTER — TREATMENT (OUTPATIENT)
Dept: PHYSICAL THERAPY | Facility: HOSPITAL | Age: 85
End: 2025-07-09
Payer: MEDICARE

## 2025-07-09 DIAGNOSIS — R32 URINARY INCONTINENCE, UNSPECIFIED TYPE: ICD-10-CM

## 2025-07-09 DIAGNOSIS — R29.898 WEAKNESS OF BOTH HIPS: Primary | ICD-10-CM

## 2025-07-09 PROCEDURE — 97110 THERAPEUTIC EXERCISES: CPT | Mod: GP

## 2025-07-09 ASSESSMENT — PAIN SCALES - GENERAL: PAINLEVEL_OUTOF10: 6

## 2025-07-09 ASSESSMENT — PAIN - FUNCTIONAL ASSESSMENT: PAIN_FUNCTIONAL_ASSESSMENT: 0-10

## 2025-07-09 NOTE — PROGRESS NOTES
Physical Therapy    Physical Therapy Treatment    Patient Name: Sangeeta Vitale  MRN: [id number]  Today's Date: 7/9/2025    Time Entry:   Time Calculation  Start Time: 1328  Stop Time: 1358  Time Calculation (min): 30 min     PT Therapeutic Procedures Time Entry  Therapeutic Exercise Time Entry: 30                 Insurance:  HUMANA MEDICARE      BOA        COPAY 0   APPROVED  10  PT  VISITS  6-9-25 THRU 7-25-25  [facility]# [id number]      [id number]/ALL     Visit: 4/10    Assessment:   Patient arrived 10 minutes late to scheduled appointment resulting in shortened treatment.  Patient educated on the importance of NOT stopping urine when she goes to the restroom. She admitted to doing this daily since her last visit. Notes that she plans to no longer do this. Continued with strengthening exercises this date with B hips. Multiple cues needed for correct technique with likely that she has been performing them incorrectly.   Plan:   30 day hold    Current Problem  1. Weakness of both hips  Follow Up In Physical Therapy      2. Urinary incontinence, unspecified type  Follow Up In Physical Therapy          General   Notes increased R knee when she stopped taking meloxicam. She is also having L hip pain. Does not feel that the exercises are increasing her knee pain.  Got up 3 times to go to the bathroom. Tried to fight the urge. Sometimes it helps and sometimes it does not help.   Has been stopping urine when she goes to the bathroom.   She is unsure if she would like to continue with PT         Subjective    Precautions  Precautions  Precautions Comment: fall       Pain  Pain Assessment  Pain Assessment: 0-10  0-10 (Numeric) Pain Score: 6  Pain Type: Chronic pain  Pain Location: Knee  Pain Orientation: Right    Objective     Treatments:  TA isometric with elbow press: 20x5 seconds   BKFO with TA isometric elbow press: GTB 20x  Hooklying hip abduction with ball press with TA isometric elbow press: 2 minutes   Supine march: GTB  x20  Bridge 3 way: x10 each    Standing march with counter press: x20     Access Code: [email or web address]  URL: [email or web address]  Date: 06/17/2025  Prepared by: Dianne Salgado

## 2025-07-16 ENCOUNTER — APPOINTMENT (OUTPATIENT)
Dept: PHYSICAL THERAPY | Facility: HOSPITAL | Age: 85
End: 2025-07-16
Payer: MEDICARE

## 2025-07-16 DIAGNOSIS — R29.898 WEAKNESS OF BOTH HIPS: Primary | ICD-10-CM

## 2025-07-22 ENCOUNTER — APPOINTMENT (OUTPATIENT)
Dept: CARDIOLOGY | Facility: CLINIC | Age: 85
End: 2025-07-22
Payer: MEDICARE

## 2025-07-31 ENCOUNTER — TELEPHONE (OUTPATIENT)
Dept: PRIMARY CARE | Facility: CLINIC | Age: 85
End: 2025-07-31
Payer: MEDICARE

## 2025-07-31 DIAGNOSIS — F32.0 MAJOR DEPRESSIVE DISORDER, SINGLE EPISODE, MILD: ICD-10-CM

## 2025-07-31 DIAGNOSIS — I10 ESSENTIAL (PRIMARY) HYPERTENSION: ICD-10-CM

## 2025-07-31 RX ORDER — SERTRALINE HYDROCHLORIDE 25 MG/1
25 TABLET, FILM COATED ORAL DAILY
Qty: 90 TABLET | Refills: 0 | Status: SHIPPED | OUTPATIENT
Start: 2025-07-31

## 2025-07-31 RX ORDER — HYDROCHLOROTHIAZIDE 12.5 MG/1
12.5 TABLET ORAL DAILY
Qty: 90 TABLET | Refills: 0 | Status: SHIPPED | OUTPATIENT
Start: 2025-07-31

## 2025-08-20 ENCOUNTER — APPOINTMENT (OUTPATIENT)
Dept: CARDIOLOGY | Facility: CLINIC | Age: 85
End: 2025-08-20
Payer: MEDICARE

## 2025-08-20 VITALS
HEART RATE: 69 BPM | BODY MASS INDEX: 33.13 KG/M2 | DIASTOLIC BLOOD PRESSURE: 66 MMHG | WEIGHT: 187 LBS | HEIGHT: 63 IN | SYSTOLIC BLOOD PRESSURE: 124 MMHG

## 2025-08-20 DIAGNOSIS — Z98.890 S/P ABLATION OF ATRIAL FIBRILLATION: ICD-10-CM

## 2025-08-20 DIAGNOSIS — E78.2 MIXED HYPERLIPIDEMIA: ICD-10-CM

## 2025-08-20 DIAGNOSIS — I10 ESSENTIAL (PRIMARY) HYPERTENSION: ICD-10-CM

## 2025-08-20 DIAGNOSIS — R06.09 DYSPNEA ON EXERTION: ICD-10-CM

## 2025-08-20 DIAGNOSIS — R06.02 SHORTNESS OF BREATH ON EXERTION: ICD-10-CM

## 2025-08-20 DIAGNOSIS — Z86.79 S/P ABLATION OF ATRIAL FIBRILLATION: ICD-10-CM

## 2025-08-20 DIAGNOSIS — I48.91 ATRIAL FIBRILLATION, UNSPECIFIED TYPE (MULTI): ICD-10-CM

## 2025-08-20 RX ORDER — LANOLIN ALCOHOL/MO/W.PET/CERES
400 CREAM (GRAM) TOPICAL 2 TIMES DAILY
Qty: 180 TABLET | Refills: 1 | Status: SHIPPED | OUTPATIENT
Start: 2025-08-20 | End: 2026-02-16

## 2025-08-21 ENCOUNTER — TELEPHONE (OUTPATIENT)
Dept: CARDIOLOGY | Facility: CLINIC | Age: 85
End: 2025-08-21
Payer: MEDICARE

## 2025-09-11 ENCOUNTER — APPOINTMENT (OUTPATIENT)
Dept: CARDIOLOGY | Facility: CLINIC | Age: 85
End: 2025-09-11
Payer: MEDICARE

## 2025-10-14 ENCOUNTER — APPOINTMENT (OUTPATIENT)
Dept: CARDIOLOGY | Facility: CLINIC | Age: 85
End: 2025-10-14
Payer: MEDICARE

## 2025-11-05 ENCOUNTER — APPOINTMENT (OUTPATIENT)
Dept: PRIMARY CARE | Facility: CLINIC | Age: 85
End: 2025-11-05
Payer: MEDICARE